# Patient Record
Sex: FEMALE | Race: BLACK OR AFRICAN AMERICAN | NOT HISPANIC OR LATINO | Employment: UNEMPLOYED | URBAN - METROPOLITAN AREA
[De-identification: names, ages, dates, MRNs, and addresses within clinical notes are randomized per-mention and may not be internally consistent; named-entity substitution may affect disease eponyms.]

---

## 2019-12-22 ENCOUNTER — HOSPITAL ENCOUNTER (EMERGENCY)
Facility: HOSPITAL | Age: 19
Discharge: HOME | End: 2019-12-22
Attending: EMERGENCY MEDICINE
Payer: MEDICAID

## 2019-12-22 VITALS
SYSTOLIC BLOOD PRESSURE: 132 MMHG | BODY MASS INDEX: 49.15 KG/M2 | WEIGHT: 267.06 LBS | TEMPERATURE: 98 F | HEIGHT: 62 IN | RESPIRATION RATE: 18 BRPM | OXYGEN SATURATION: 98 % | HEART RATE: 76 BPM | DIASTOLIC BLOOD PRESSURE: 59 MMHG

## 2019-12-22 DIAGNOSIS — R07.9 CHEST PAIN: ICD-10-CM

## 2019-12-22 DIAGNOSIS — O21.0 HYPEREMESIS GRAVIDARUM: Primary | ICD-10-CM

## 2019-12-22 DIAGNOSIS — N39.0 LOWER URINARY TRACT INFECTION: ICD-10-CM

## 2019-12-22 LAB
ALBUMIN SERPL BCP-MCNC: 3.8 G/DL (ref 3.5–5.2)
ALP SERPL-CCNC: 116 U/L (ref 55–135)
ALT SERPL W/O P-5'-P-CCNC: 43 U/L (ref 10–44)
ANION GAP SERPL CALC-SCNC: 16 MMOL/L (ref 8–16)
AST SERPL-CCNC: 34 U/L (ref 10–40)
BACTERIA #/AREA URNS HPF: ABNORMAL /HPF
BASOPHILS # BLD AUTO: 0.03 K/UL (ref 0–0.2)
BASOPHILS NFR BLD: 0.4 % (ref 0–1.9)
BILIRUB SERPL-MCNC: 2.9 MG/DL (ref 0.1–1)
BILIRUB UR QL STRIP: ABNORMAL
BUN SERPL-MCNC: 6 MG/DL (ref 6–20)
CALCIUM SERPL-MCNC: 10.3 MG/DL (ref 8.7–10.5)
CHLORIDE SERPL-SCNC: 105 MMOL/L (ref 95–110)
CLARITY UR: ABNORMAL
CO2 SERPL-SCNC: 16 MMOL/L (ref 23–29)
COLOR UR: YELLOW
CREAT SERPL-MCNC: 0.7 MG/DL (ref 0.5–1.4)
DIFFERENTIAL METHOD: ABNORMAL
EOSINOPHIL # BLD AUTO: 0 K/UL (ref 0–0.5)
EOSINOPHIL NFR BLD: 0.1 % (ref 0–8)
ERYTHROCYTE [DISTWIDTH] IN BLOOD BY AUTOMATED COUNT: 13.4 % (ref 11.5–14.5)
EST. GFR  (AFRICAN AMERICAN): >60 ML/MIN/1.73 M^2
EST. GFR  (NON AFRICAN AMERICAN): >60 ML/MIN/1.73 M^2
GLUCOSE SERPL-MCNC: 97 MG/DL (ref 70–110)
GLUCOSE UR QL STRIP: NEGATIVE
HCT VFR BLD AUTO: 41.5 % (ref 37–48.5)
HGB BLD-MCNC: 13.9 G/DL (ref 12–16)
HGB UR QL STRIP: NEGATIVE
HIV 1+2 AB+HIV1 P24 AG SERPL QL IA: NEGATIVE
HYALINE CASTS #/AREA URNS LPF: 0 /LPF
IMM GRANULOCYTES # BLD AUTO: 0.04 K/UL (ref 0–0.04)
IMM GRANULOCYTES NFR BLD AUTO: 0.6 % (ref 0–0.5)
INFLUENZA A, MOLECULAR: NEGATIVE
INFLUENZA B, MOLECULAR: NEGATIVE
KETONES UR QL STRIP: ABNORMAL
LEUKOCYTE ESTERASE UR QL STRIP: ABNORMAL
LYMPHOCYTES # BLD AUTO: 1.5 K/UL (ref 1–4.8)
LYMPHOCYTES NFR BLD: 22.1 % (ref 18–48)
MCH RBC QN AUTO: 28.7 PG (ref 27–31)
MCHC RBC AUTO-ENTMCNC: 33.5 G/DL (ref 32–36)
MCV RBC AUTO: 86 FL (ref 82–98)
MICROSCOPIC COMMENT: ABNORMAL
MONOCYTES # BLD AUTO: 0.4 K/UL (ref 0.3–1)
MONOCYTES NFR BLD: 6.1 % (ref 4–15)
NEUTROPHILS # BLD AUTO: 4.9 K/UL (ref 1.8–7.7)
NEUTROPHILS NFR BLD: 70.7 % (ref 38–73)
NITRITE UR QL STRIP: NEGATIVE
NRBC BLD-RTO: 0 /100 WBC
PH UR STRIP: 6 [PH] (ref 5–8)
PLATELET # BLD AUTO: 380 K/UL (ref 150–350)
PMV BLD AUTO: 11.1 FL (ref 9.2–12.9)
POTASSIUM SERPL-SCNC: 3.2 MMOL/L (ref 3.5–5.1)
PROT SERPL-MCNC: 8 G/DL (ref 6–8.4)
PROT UR QL STRIP: ABNORMAL
RBC # BLD AUTO: 4.85 M/UL (ref 4–5.4)
RBC #/AREA URNS HPF: 0 /HPF (ref 0–4)
SODIUM SERPL-SCNC: 137 MMOL/L (ref 136–145)
SP GR UR STRIP: >=1.03 (ref 1–1.03)
SPECIMEN SOURCE: NORMAL
SQUAMOUS #/AREA URNS HPF: 10 /HPF
URN SPEC COLLECT METH UR: ABNORMAL
UROBILINOGEN UR STRIP-ACNC: ABNORMAL EU/DL
WBC # BLD AUTO: 6.93 K/UL (ref 3.9–12.7)
WBC #/AREA URNS HPF: 30 /HPF (ref 0–5)
YEAST URNS QL MICRO: ABNORMAL

## 2019-12-22 PROCEDURE — 93010 EKG 12-LEAD: ICD-10-PCS | Mod: ,,, | Performed by: INTERNAL MEDICINE

## 2019-12-22 PROCEDURE — 85025 COMPLETE CBC W/AUTO DIFF WBC: CPT

## 2019-12-22 PROCEDURE — 99284 EMERGENCY DEPT VISIT MOD MDM: CPT | Mod: 25

## 2019-12-22 PROCEDURE — 96361 HYDRATE IV INFUSION ADD-ON: CPT

## 2019-12-22 PROCEDURE — 63600175 PHARM REV CODE 636 W HCPCS: Performed by: REGISTERED NURSE

## 2019-12-22 PROCEDURE — 80053 COMPREHEN METABOLIC PANEL: CPT

## 2019-12-22 PROCEDURE — 93005 ELECTROCARDIOGRAM TRACING: CPT

## 2019-12-22 PROCEDURE — 96372 THER/PROPH/DIAG INJ SC/IM: CPT

## 2019-12-22 PROCEDURE — 86703 HIV-1/HIV-2 1 RESULT ANTBDY: CPT

## 2019-12-22 PROCEDURE — 93010 ELECTROCARDIOGRAM REPORT: CPT | Mod: ,,, | Performed by: INTERNAL MEDICINE

## 2019-12-22 PROCEDURE — 96375 TX/PRO/DX INJ NEW DRUG ADDON: CPT

## 2019-12-22 PROCEDURE — 87502 INFLUENZA DNA AMP PROBE: CPT

## 2019-12-22 PROCEDURE — 87086 URINE CULTURE/COLONY COUNT: CPT

## 2019-12-22 PROCEDURE — 96374 THER/PROPH/DIAG INJ IV PUSH: CPT

## 2019-12-22 PROCEDURE — 81000 URINALYSIS NONAUTO W/SCOPE: CPT

## 2019-12-22 RX ORDER — NITROFURANTOIN 25; 75 MG/1; MG/1
100 CAPSULE ORAL 2 TIMES DAILY
Qty: 10 CAPSULE | Refills: 0 | Status: SHIPPED | OUTPATIENT
Start: 2019-12-22 | End: 2019-12-27

## 2019-12-22 RX ORDER — METOCLOPRAMIDE HYDROCHLORIDE 5 MG/ML
10 INJECTION INTRAMUSCULAR; INTRAVENOUS
Status: COMPLETED | OUTPATIENT
Start: 2019-12-22 | End: 2019-12-22

## 2019-12-22 RX ORDER — CEFTRIAXONE 1 G/1
1 INJECTION, POWDER, FOR SOLUTION INTRAMUSCULAR; INTRAVENOUS
Status: COMPLETED | OUTPATIENT
Start: 2019-12-22 | End: 2019-12-22

## 2019-12-22 RX ORDER — DIPHENHYDRAMINE HYDROCHLORIDE 50 MG/ML
12.5 INJECTION INTRAMUSCULAR; INTRAVENOUS
Status: COMPLETED | OUTPATIENT
Start: 2019-12-22 | End: 2019-12-22

## 2019-12-22 RX ORDER — METOCLOPRAMIDE 10 MG/1
10 TABLET ORAL EVERY 6 HOURS PRN
Qty: 30 TABLET | Refills: 0 | Status: SHIPPED | OUTPATIENT
Start: 2019-12-22

## 2019-12-22 RX ADMIN — SODIUM CHLORIDE 1000 ML: 0.9 INJECTION, SOLUTION INTRAVENOUS at 04:12

## 2019-12-22 RX ADMIN — DIPHENHYDRAMINE HYDROCHLORIDE 12.5 MG: 50 INJECTION, SOLUTION INTRAMUSCULAR; INTRAVENOUS at 04:12

## 2019-12-22 RX ADMIN — METOCLOPRAMIDE 10 MG: 5 INJECTION, SOLUTION INTRAMUSCULAR; INTRAVENOUS at 04:12

## 2019-12-22 RX ADMIN — CEFTRIAXONE SODIUM 1 G: 1 INJECTION, POWDER, FOR SOLUTION INTRAMUSCULAR; INTRAVENOUS at 06:12

## 2019-12-22 NOTE — ED PROVIDER NOTES
SCRIBE #1 NOTE: I, Graciela Mooreal, am scribing for, and in the presence of, Andres Pro Jr., FNP. I have scribed the entire note.      History      Chief Complaint   Patient presents with    Chest Pain     nausea/vomiting x3 days, cough, denies fever       Review of patient's allergies indicates:  No Known Allergies     HPI   HPI    12/22/2019, 4:37 PM   History obtained from the patient      History of Present Illness: Yecenia Clarke is a 19 y.o. female patient who presents to the Emergency Department for nausea and vomitting which onset 3 days PTA. Pt is 8 weeks pregnant and [facility]. Symptoms are constant and moderate in severity.There are no mitigating or exacerbating factors noted. Associated sxs include cough, diarrhea. Patient denies any fever, chills, numbness, weakness, SOB, abd pain, vaginal bleed, vaginal discharge, and all other sxs at this time. Prior Tx includes Phenergan but was unable to keep down. No further complaints or concerns at this time.     While the chief complaint by nursing assessment was documented as CP, the patient indicates their chief complaint during my interview as nausea and vomitting.    Arrival mode: Personal vehicle    Past Medical History:  History reviewed. No pertinent medical history.    Past Surgical History:  History reviewed. No pertinent surgical history.    Family History:  History reviewed. No pertinent family history.    Social History:  Social History Main Topics    Smoking status: Unknown if ever smoked    Smokeless tobacco: Unknown if ever used    Alcohol Use: Unknown drinking history    Drug Use: Unknown if ever used    Sexual Activity: Unknown       ROS   Review of Systems   Constitutional: Negative for chills and fever.   HENT: Negative for sore throat.    Respiratory: Positive for cough. Negative for shortness of breath.    Cardiovascular: Negative for chest pain.   Gastrointestinal: Positive for diarrhea, nausea and  "vomiting. Negative for abdominal pain.   Genitourinary: Negative for dysuria, vaginal bleeding and vaginal discharge.   Musculoskeletal: Negative for back pain.   Skin: Negative for rash.   Neurological: Negative for weakness, numbness and headaches.   Hematological: Does not bruise/bleed easily.   All other systems reviewed and are negative.      Physical Exam      Initial Vitals [12/22/19 1628]   BP Pulse Resp Temp SpO2   130/77 93 18 98.3 °F (36.8 °C) 99 %      MAP       --          Physical Exam  Nursing Notes and Vital Signs Reviewed.  Constitutional: Patient is in no acute distress. Well-developed and well-nourished.  Head: Atraumatic. Normocephalic.  Eyes: PERRL. EOM intact. Conjunctivae are not pale. No scleral icterus.  ENT: Mucous membranes are moist. Oropharynx is clear and symmetric.    Neck: Supple. Full ROM. No lymphadenopathy.  Cardiovascular: Regular rate. Regular rhythm. No murmurs, rubs, or gallops. Distal pulses are 2+ and symmetric.  Pulmonary/Chest: No respiratory distress. Clear to auscultation bilaterally. No wheezing or rales.  Abdominal: Soft and non-distended.  There is no tenderness.  No rebound, guarding, or rigidity. Good bowel sounds.  Genitourinary: No CVA tenderness  Musculoskeletal: Moves all extremities. No obvious deformities. No edema. No calf tenderness.  Skin: Warm and dry.  Neurological:  Alert, awake, and appropriate.  Normal speech.  No acute focal neurological deficits are appreciated.  Psychiatric: Normal affect. Good eye contact. Appropriate in content.    ED Course    Procedures  ED Vital Signs:  Vitals:    12/22/19 1628 12/22/19 1839   BP: 130/77 (!) 132/59   Pulse: 93 76   Resp: 18 18   Temp: 98.3 °F (36.8 °C)    TempSrc: Oral    SpO2: 99% 98%   Weight: 121.2 kg (267 lb 1.4 oz)    Height: 5' 2" (1.575 m)        Abnormal Lab Results:  Labs Reviewed   CBC W/ AUTO DIFFERENTIAL - Abnormal; Notable for the following components:       Result Value    Platelets 380 (*)     " Immature Granulocytes 0.6 (*)     All other components within normal limits   COMPREHENSIVE METABOLIC PANEL - Abnormal; Notable for the following components:    Potassium 3.2 (*)     CO2 16 (*)     Total Bilirubin 2.9 (*)     All other components within normal limits   URINALYSIS, REFLEX TO URINE CULTURE - Abnormal; Notable for the following components:    Appearance, UA Hazy (*)     Specific Gravity, UA >=1.030 (*)     Protein, UA 1+ (*)     Ketones, UA 3+ (*)     Bilirubin (UA) 2+ (*)     Urobilinogen, UA 2.0-3.0 (*)     Leukocytes, UA Trace (*)     All other components within normal limits    Narrative:     Preferred Collection Type->Urine, Clean Catch   URINALYSIS MICROSCOPIC - Abnormal; Notable for the following components:    WBC, UA 30 (*)     Bacteria Many (*)     All other components within normal limits    Narrative:     Preferred Collection Type->Urine, Clean Catch   INFLUENZA A & B BY MOLECULAR   CULTURE, URINE   HIV 1 / 2 ANTIBODY        All Lab Results:  Results for orders placed or performed during the hospital encounter of 12/22/19   Influenza A & B by Molecular   Result Value Ref Range    Influenza A, Molecular Negative Negative    Influenza B, Molecular Negative Negative    Flu A & B Source Nasal swab    HIV 1/2 Ag/Ab (4th Gen)   Result Value Ref Range    HIV 1/2 Ag/Ab Negative Negative   CBC auto differential   Result Value Ref Range    WBC 6.93 3.90 - 12.70 K/uL    RBC 4.85 4.00 - 5.40 M/uL    Hemoglobin 13.9 12.0 - 16.0 g/dL    Hematocrit 41.5 37.0 - 48.5 %    Mean Corpuscular Volume 86 82 - 98 fL    Mean Corpuscular Hemoglobin 28.7 27.0 - 31.0 pg    Mean Corpuscular Hemoglobin Conc 33.5 32.0 - 36.0 g/dL    RDW 13.4 11.5 - 14.5 %    Platelets 380 (H) 150 - 350 K/uL    MPV 11.1 9.2 - 12.9 fL    Immature Granulocytes 0.6 (H) 0.0 - 0.5 %    Gran # (ANC) 4.9 1.8 - 7.7 K/uL    Immature Grans (Abs) 0.04 0.00 - 0.04 K/uL    Lymph # 1.5 1.0 - 4.8 K/uL    Mono # 0.4 0.3 - 1.0 K/uL    Eos # 0.0 0.0 - 0.5  K/uL    Baso # 0.03 0.00 - 0.20 K/uL    nRBC 0 0 /100 WBC    Gran% 70.7 38.0 - 73.0 %    Lymph% 22.1 18.0 - 48.0 %    Mono% 6.1 4.0 - 15.0 %    Eosinophil% 0.1 0.0 - 8.0 %    Basophil% 0.4 0.0 - 1.9 %    Differential Method Automated    Comprehensive metabolic panel   Result Value Ref Range    Sodium 137 136 - 145 mmol/L    Potassium 3.2 (L) 3.5 - 5.1 mmol/L    Chloride 105 95 - 110 mmol/L    CO2 16 (L) 23 - 29 mmol/L    Glucose 97 70 - 110 mg/dL    BUN, Bld 6 6 - 20 mg/dL    Creatinine 0.7 0.5 - 1.4 mg/dL    Calcium 10.3 8.7 - 10.5 mg/dL    Total Protein 8.0 6.0 - 8.4 g/dL    Albumin 3.8 3.5 - 5.2 g/dL    Total Bilirubin 2.9 (H) 0.1 - 1.0 mg/dL    Alkaline Phosphatase 116 55 - 135 U/L    AST 34 10 - 40 U/L    ALT 43 10 - 44 U/L    Anion Gap 16 8 - 16 mmol/L    eGFR if African American >60 >60 mL/min/1.73 m^2    eGFR if non African American >60 >60 mL/min/1.73 m^2   Urinalysis, Reflex to Urine Culture Urine, Clean Catch   Result Value Ref Range    Specimen UA Urine, Clean Catch     Color, UA Yellow Yellow, Straw, Olga    Appearance, UA Hazy (A) Clear    pH, UA 6.0 5.0 - 8.0    Specific Gravity, UA >=1.030 (A) 1.005 - 1.030    Protein, UA 1+ (A) Negative    Glucose, UA Negative Negative    Ketones, UA 3+ (A) Negative    Bilirubin (UA) 2+ (A) Negative    Occult Blood UA Negative Negative    Nitrite, UA Negative Negative    Urobilinogen, UA 2.0-3.0 (A) <2.0 EU/dL    Leukocytes, UA Trace (A) Negative   Urinalysis Microscopic   Result Value Ref Range    RBC, UA 0 0 - 4 /hpf    WBC, UA 30 (H) 0 - 5 /hpf    Bacteria Many (A) None-Occ /hpf    Yeast, UA None None    Squam Epithel, UA 10 /hpf    Hyaline Casts, UA 0 0-1/lpf /lpf    Microscopic Comment SEE COMMENT          Imaging Results:  Imaging Results    None        The EKG was ordered, reviewed, and independently interpreted by the ED provider.  Interpretation time: 16:33  Rate: 88 BPM  Rhythm: Sinus rhythm with marked sinus arythmia  Interpretation: Minimal voltage  criteria for LVH, may be normal variant. No STEMI.           The Emergency Provider reviewed the vital signs and test results, which are outlined above.    ED Discussion     6:07 PM: Reassessed pt at this time.  Pt states her condition has improved at this time. Discussed with pt all pertinent ED information and results. Discussed pt dx and plan of tx. Gave pt all f/u and return to the ED instructions. All questions and concerns were addressed at this time. Pt expresses understanding of information and instructions, and is comfortable with plan to discharge. Pt is stable for discharge.    I discussed with patient and/or family/caretaker that evaluation in the ED does not suggest any emergent or life threatening medical conditions requiring immediate intervention beyond what was provided in the ED, and I believe patient is safe for discharge.  Regardless, an unremarkable evaluation in the ED does not preclude the development or presence of a serious of life threatening condition. As such, patient was instructed to return immediately for any worsening or change in current symptoms.    I counseled the patient on the risks of taking antibiotics, including taking all doses as prescribed. I explained the risk of antibiotic resistance in the future and susceptibility to C. diff infection, severe allergic reactions, and yeast infections.       ED Medication(s):  Medications   sodium chloride 0.9% bolus 1,000 mL (0 mLs Intravenous Stopped 12/22/19 1831)   metoclopramide HCl injection 10 mg (10 mg Intravenous Given 12/22/19 1655)   diphenhydrAMINE injection 12.5 mg (12.5 mg Intravenous Given 12/22/19 1655)   cefTRIAXone injection 1 g (1 g Intramuscular Given 12/22/19 1837)     Discharge Medication List as of 12/22/2019  6:07 PM      START taking these medications    Details   metoclopramide HCl (REGLAN) 10 MG tablet Take 1 tablet (10 mg total) by mouth every 6 (six) hours as needed (nausea and vomiting)., Starting Sun  12/22/2019, Print      nitrofurantoin, macrocrystal-monohydrate, (MACROBID) 100 MG capsule Take 1 capsule (100 mg total) by mouth 2 (two) times daily. for 5 days, Starting Sun 12/22/2019, Until Fri 12/27/2019, Print              Medication List      START taking these medications    metoclopramide HCl 10 MG tablet  Commonly known as:  REGLAN  Take 1 tablet (10 mg total) by mouth every 6 (six) hours as needed (nausea and vomiting).     nitrofurantoin (macrocrystal-monohydrate) 100 MG capsule  Commonly known as:  MACROBID  Take 1 capsule (100 mg total) by mouth 2 (two) times daily. for 5 days           Where to Get Your Medications      You can get these medications from any pharmacy    Bring a paper prescription for each of these medications  · metoclopramide HCl 10 MG tablet  · nitrofurantoin (macrocrystal-monohydrate) 100 MG capsule         Follow-up Information     [facility] - .    Specialty:  Emergency Medicine  Why:  If symptoms worsen  Contact information:  [id number] [place] [id number] [phone]           OB/GYN In 3 days.                   Medical Decision Making    Medical Decision Making:   Clinical Tests:   Lab Tests: Ordered and Reviewed  Medical Tests: Ordered and Reviewed           Scribe Attestation:   Scribe #1: I performed the above scribed service and the documentation accurately describes the services I performed. I attest to the accuracy of the note.    Attending:   Physician Attestation Statement for Scribe #1: I, Andres Pro Jr., FNP, personally performed the services described in this documentation, as scribed by Graciela rGewal, in my presence, and it is both accurate and complete.          Clinical Impression       ICD-10-CM ICD-9-CM   1. Hyperemesis gravidarum O21.0 643.00   2. Chest pain R07.9 786.50   3. Lower urinary tract infection N39.0 599.0       Disposition:   Disposition: Discharged  Condition: Stable         Andres Pro  , [facility]  12/22/19 0542

## 2019-12-23 LAB — BACTERIA UR CULT: NORMAL

## 2019-12-24 ENCOUNTER — HOSPITAL ENCOUNTER (INPATIENT)
Facility: HOSPITAL | Age: 19
LOS: 4 days | Discharge: HOME | DRG: 832 | End: 2019-12-28
Attending: EMERGENCY MEDICINE | Admitting: OBSTETRICS & GYNECOLOGY
Payer: MEDICAID

## 2019-12-24 DIAGNOSIS — O21.0 HYPEREMESIS GRAVIDARUM: Primary | ICD-10-CM

## 2019-12-24 DIAGNOSIS — R11.10 HYPEREMESIS: ICD-10-CM

## 2019-12-24 DIAGNOSIS — E87.6 HYPOKALEMIA: ICD-10-CM

## 2019-12-24 DIAGNOSIS — A59.01 VAGINITIS DUE TO TRICHOMONAS: ICD-10-CM

## 2019-12-24 LAB
ALBUMIN SERPL BCP-MCNC: 3.7 G/DL (ref 3.5–5.2)
ALP SERPL-CCNC: 113 U/L (ref 55–135)
ALT SERPL W/O P-5'-P-CCNC: 60 U/L (ref 10–44)
ANION GAP SERPL CALC-SCNC: 19 MMOL/L (ref 8–16)
AST SERPL-CCNC: 37 U/L (ref 10–40)
B-HCG UR QL: POSITIVE
BACTERIA #/AREA URNS HPF: ABNORMAL /HPF
BASOPHILS # BLD AUTO: 0.02 K/UL (ref 0–0.2)
BASOPHILS NFR BLD: 0.3 % (ref 0–1.9)
BILIRUB SERPL-MCNC: 2.4 MG/DL (ref 0.1–1)
BILIRUB UR QL STRIP: ABNORMAL
BUN SERPL-MCNC: 4 MG/DL (ref 6–20)
CALCIUM SERPL-MCNC: 9.9 MG/DL (ref 8.7–10.5)
CHLORIDE SERPL-SCNC: 103 MMOL/L (ref 95–110)
CLARITY UR: CLEAR
CO2 SERPL-SCNC: 16 MMOL/L (ref 23–29)
COLOR UR: YELLOW
CREAT SERPL-MCNC: 0.7 MG/DL (ref 0.5–1.4)
DIFFERENTIAL METHOD: ABNORMAL
EOSINOPHIL # BLD AUTO: 0 K/UL (ref 0–0.5)
EOSINOPHIL NFR BLD: 0.1 % (ref 0–8)
ERYTHROCYTE [DISTWIDTH] IN BLOOD BY AUTOMATED COUNT: 13.3 % (ref 11.5–14.5)
EST. GFR  (AFRICAN AMERICAN): >60 ML/MIN/1.73 M^2
EST. GFR  (NON AFRICAN AMERICAN): >60 ML/MIN/1.73 M^2
GLUCOSE SERPL-MCNC: 79 MG/DL (ref 70–110)
GLUCOSE UR QL STRIP: NEGATIVE
HCT VFR BLD AUTO: 41 % (ref 37–48.5)
HGB BLD-MCNC: 13.8 G/DL (ref 12–16)
HGB UR QL STRIP: NEGATIVE
HYALINE CASTS #/AREA URNS LPF: 7 /LPF
IMM GRANULOCYTES # BLD AUTO: 0.02 K/UL (ref 0–0.04)
IMM GRANULOCYTES NFR BLD AUTO: 0.3 % (ref 0–0.5)
KETONES UR QL STRIP: ABNORMAL
LEUKOCYTE ESTERASE UR QL STRIP: ABNORMAL
LIPASE SERPL-CCNC: 42 U/L (ref 4–60)
LYMPHOCYTES # BLD AUTO: 1.7 K/UL (ref 1–4.8)
LYMPHOCYTES NFR BLD: 23 % (ref 18–48)
MAGNESIUM SERPL-MCNC: 1.7 MG/DL (ref 1.6–2.6)
MCH RBC QN AUTO: 28.8 PG (ref 27–31)
MCHC RBC AUTO-ENTMCNC: 33.7 G/DL (ref 32–36)
MCV RBC AUTO: 85 FL (ref 82–98)
MICROSCOPIC COMMENT: ABNORMAL
MONOCYTES # BLD AUTO: 0.6 K/UL (ref 0.3–1)
MONOCYTES NFR BLD: 7.8 % (ref 4–15)
NEUTROPHILS # BLD AUTO: 5.1 K/UL (ref 1.8–7.7)
NEUTROPHILS NFR BLD: 68.5 % (ref 38–73)
NITRITE UR QL STRIP: NEGATIVE
NRBC BLD-RTO: 0 /100 WBC
PH UR STRIP: 6 [PH] (ref 5–8)
PLATELET # BLD AUTO: 364 K/UL (ref 150–350)
PMV BLD AUTO: 11.3 FL (ref 9.2–12.9)
POTASSIUM SERPL-SCNC: 3.7 MMOL/L (ref 3.5–5.1)
PROT SERPL-MCNC: 8.1 G/DL (ref 6–8.4)
PROT UR QL STRIP: ABNORMAL
RBC # BLD AUTO: 4.8 M/UL (ref 4–5.4)
RBC #/AREA URNS HPF: 0 /HPF (ref 0–4)
SODIUM SERPL-SCNC: 138 MMOL/L (ref 136–145)
SP GR UR STRIP: >=1.03 (ref 1–1.03)
SQUAMOUS #/AREA URNS HPF: 10 /HPF
TRICHOMONAS UR QL MICRO: ABNORMAL
URN SPEC COLLECT METH UR: ABNORMAL
UROBILINOGEN UR STRIP-ACNC: ABNORMAL EU/DL
WBC # BLD AUTO: 7.42 K/UL (ref 3.9–12.7)
WBC #/AREA URNS HPF: 10 /HPF (ref 0–5)

## 2019-12-24 PROCEDURE — 96365 THER/PROPH/DIAG IV INF INIT: CPT

## 2019-12-24 PROCEDURE — 63600175 PHARM REV CODE 636 W HCPCS: Performed by: EMERGENCY MEDICINE

## 2019-12-24 PROCEDURE — 80053 COMPREHEN METABOLIC PANEL: CPT

## 2019-12-24 PROCEDURE — 11000001 HC ACUTE MED/SURG PRIVATE ROOM

## 2019-12-24 PROCEDURE — 81025 URINE PREGNANCY TEST: CPT

## 2019-12-24 PROCEDURE — 36415 COLL VENOUS BLD VENIPUNCTURE: CPT

## 2019-12-24 PROCEDURE — 99285 EMERGENCY DEPT VISIT HI MDM: CPT | Mod: 25

## 2019-12-24 PROCEDURE — 83690 ASSAY OF LIPASE: CPT

## 2019-12-24 PROCEDURE — 96375 TX/PRO/DX INJ NEW DRUG ADDON: CPT

## 2019-12-24 PROCEDURE — 96367 TX/PROPH/DG ADDL SEQ IV INF: CPT

## 2019-12-24 PROCEDURE — 81000 URINALYSIS NONAUTO W/SCOPE: CPT

## 2019-12-24 PROCEDURE — 85025 COMPLETE CBC W/AUTO DIFF WBC: CPT

## 2019-12-24 PROCEDURE — 96361 HYDRATE IV INFUSION ADD-ON: CPT

## 2019-12-24 PROCEDURE — 83735 ASSAY OF MAGNESIUM: CPT

## 2019-12-24 PROCEDURE — 25000003 PHARM REV CODE 250: Performed by: EMERGENCY MEDICINE

## 2019-12-24 RX ORDER — POTASSIUM CHLORIDE 20 MEQ/15ML
40 SOLUTION ORAL
Status: COMPLETED | OUTPATIENT
Start: 2019-12-24 | End: 2019-12-24

## 2019-12-24 RX ORDER — ONDANSETRON 2 MG/ML
4 INJECTION INTRAMUSCULAR; INTRAVENOUS
Status: COMPLETED | OUTPATIENT
Start: 2019-12-24 | End: 2019-12-24

## 2019-12-24 RX ORDER — POTASSIUM CHLORIDE 20 MEQ/1
40 TABLET, EXTENDED RELEASE ORAL ONCE
Status: COMPLETED | OUTPATIENT
Start: 2019-12-24 | End: 2019-12-24

## 2019-12-24 RX ADMIN — CEFTRIAXONE 1 G: 1 INJECTION, SOLUTION INTRAVENOUS at 07:12

## 2019-12-24 RX ADMIN — POTASSIUM CHLORIDE 40 MEQ: 20 SOLUTION ORAL at 09:12

## 2019-12-24 RX ADMIN — SODIUM CHLORIDE 1000 ML: 0.9 INJECTION, SOLUTION INTRAVENOUS at 07:12

## 2019-12-24 RX ADMIN — PROMETHAZINE HYDROCHLORIDE 12.5 MG: 25 INJECTION INTRAMUSCULAR; INTRAVENOUS at 09:12

## 2019-12-24 RX ADMIN — POTASSIUM CHLORIDE 40 MEQ: 20 TABLET, EXTENDED RELEASE ORAL at 09:12

## 2019-12-24 RX ADMIN — ONDANSETRON 4 MG: 2 INJECTION INTRAMUSCULAR; INTRAVENOUS at 07:12

## 2019-12-25 PROBLEM — E87.6 HYPOKALEMIA: Status: ACTIVE | Noted: 2019-12-25

## 2019-12-25 PROBLEM — O21.0 HYPEREMESIS GRAVIDARUM: Status: ACTIVE | Noted: 2019-12-25

## 2019-12-25 PROBLEM — R11.10 HYPEREMESIS: Status: ACTIVE | Noted: 2019-12-25

## 2019-12-25 PROBLEM — A59.9 TRICHOMONAS INFECTION: Status: ACTIVE | Noted: 2019-12-25

## 2019-12-25 LAB
ANION GAP SERPL CALC-SCNC: 14 MMOL/L (ref 8–16)
BUN SERPL-MCNC: 3 MG/DL (ref 6–20)
CALCIUM SERPL-MCNC: 9.3 MG/DL (ref 8.7–10.5)
CHLORIDE SERPL-SCNC: 105 MMOL/L (ref 95–110)
CO2 SERPL-SCNC: 19 MMOL/L (ref 23–29)
CREAT SERPL-MCNC: 0.7 MG/DL (ref 0.5–1.4)
EST. GFR  (AFRICAN AMERICAN): >60 ML/MIN/1.73 M^2
EST. GFR  (NON AFRICAN AMERICAN): >60 ML/MIN/1.73 M^2
GLUCOSE SERPL-MCNC: 109 MG/DL (ref 70–110)
MAGNESIUM SERPL-MCNC: 1.6 MG/DL (ref 1.6–2.6)
POTASSIUM SERPL-SCNC: 2.7 MMOL/L (ref 3.5–5.1)
SODIUM SERPL-SCNC: 138 MMOL/L (ref 136–145)

## 2019-12-25 PROCEDURE — 63600175 PHARM REV CODE 636 W HCPCS: Performed by: OBSTETRICS & GYNECOLOGY

## 2019-12-25 PROCEDURE — 63600175 PHARM REV CODE 636 W HCPCS: Performed by: EMERGENCY MEDICINE

## 2019-12-25 PROCEDURE — 25000003 PHARM REV CODE 250: Performed by: OBSTETRICS & GYNECOLOGY

## 2019-12-25 PROCEDURE — 99223 1ST HOSP IP/OBS HIGH 75: CPT | Mod: ,,, | Performed by: OBSTETRICS & GYNECOLOGY

## 2019-12-25 PROCEDURE — 80048 BASIC METABOLIC PNL TOTAL CA: CPT

## 2019-12-25 PROCEDURE — 96374 THER/PROPH/DIAG INJ IV PUSH: CPT | Mod: 59

## 2019-12-25 PROCEDURE — 96375 TX/PRO/DX INJ NEW DRUG ADDON: CPT

## 2019-12-25 PROCEDURE — 11000001 HC ACUTE MED/SURG PRIVATE ROOM

## 2019-12-25 PROCEDURE — G0378 HOSPITAL OBSERVATION PER HR: HCPCS

## 2019-12-25 PROCEDURE — 99223 PR INITIAL HOSPITAL CARE,LEVL III: ICD-10-PCS | Mod: ,,, | Performed by: OBSTETRICS & GYNECOLOGY

## 2019-12-25 PROCEDURE — 83735 ASSAY OF MAGNESIUM: CPT

## 2019-12-25 PROCEDURE — 96361 HYDRATE IV INFUSION ADD-ON: CPT

## 2019-12-25 PROCEDURE — S0028 INJECTION, FAMOTIDINE, 20 MG: HCPCS | Performed by: OBSTETRICS & GYNECOLOGY

## 2019-12-25 PROCEDURE — 36415 COLL VENOUS BLD VENIPUNCTURE: CPT

## 2019-12-25 PROCEDURE — 96376 TX/PRO/DX INJ SAME DRUG ADON: CPT

## 2019-12-25 RX ORDER — DEXTROSE MONOHYDRATE AND SODIUM CHLORIDE 5; .9 G/100ML; G/100ML
1000 INJECTION, SOLUTION INTRAVENOUS CONTINUOUS
Status: DISCONTINUED | OUTPATIENT
Start: 2019-12-25 | End: 2019-12-25

## 2019-12-25 RX ORDER — FAMOTIDINE 20 MG/50ML
20 INJECTION, SOLUTION INTRAVENOUS 2 TIMES DAILY
Status: DISCONTINUED | OUTPATIENT
Start: 2019-12-25 | End: 2019-12-28 | Stop reason: HOSPADM

## 2019-12-25 RX ORDER — ONDANSETRON 2 MG/ML
4 INJECTION INTRAMUSCULAR; INTRAVENOUS EVERY 6 HOURS PRN
Status: DISCONTINUED | OUTPATIENT
Start: 2019-12-25 | End: 2019-12-28 | Stop reason: HOSPADM

## 2019-12-25 RX ORDER — SCOLOPAMINE TRANSDERMAL SYSTEM 1 MG/1
1 PATCH, EXTENDED RELEASE TRANSDERMAL
Status: DISCONTINUED | OUTPATIENT
Start: 2019-12-25 | End: 2019-12-28 | Stop reason: HOSPADM

## 2019-12-25 RX ORDER — POTASSIUM CHLORIDE 20 MEQ/1
40 TABLET, EXTENDED RELEASE ORAL 2 TIMES DAILY
Status: DISCONTINUED | OUTPATIENT
Start: 2019-12-25 | End: 2019-12-27

## 2019-12-25 RX ORDER — ONDANSETRON 8 MG/1
8 TABLET, ORALLY DISINTEGRATING ORAL EVERY 8 HOURS PRN
Status: DISCONTINUED | OUTPATIENT
Start: 2019-12-25 | End: 2019-12-28 | Stop reason: HOSPADM

## 2019-12-25 RX ORDER — POTASSIUM CHLORIDE 7.45 MG/ML
10 INJECTION INTRAVENOUS
Status: COMPLETED | OUTPATIENT
Start: 2019-12-25 | End: 2019-12-25

## 2019-12-25 RX ORDER — DEXTROSE, SODIUM CHLORIDE, SODIUM LACTATE, POTASSIUM CHLORIDE, AND CALCIUM CHLORIDE 5; .6; .31; .03; .02 G/100ML; G/100ML; G/100ML; G/100ML; G/100ML
INJECTION, SOLUTION INTRAVENOUS CONTINUOUS
Status: DISCONTINUED | OUTPATIENT
Start: 2019-12-25 | End: 2019-12-27

## 2019-12-25 RX ADMIN — FAMOTIDINE 20 MG: 20 INJECTION, SOLUTION INTRAVENOUS at 08:12

## 2019-12-25 RX ADMIN — DEXTROSE, SODIUM CHLORIDE, SODIUM LACTATE, POTASSIUM CHLORIDE, AND CALCIUM CHLORIDE: 5; .6; .31; .03; .02 INJECTION, SOLUTION INTRAVENOUS at 09:12

## 2019-12-25 RX ADMIN — POTASSIUM CHLORIDE 40 MEQ: 20 TABLET, EXTENDED RELEASE ORAL at 08:12

## 2019-12-25 RX ADMIN — ONDANSETRON 4 MG: 2 INJECTION INTRAMUSCULAR; INTRAVENOUS at 08:12

## 2019-12-25 RX ADMIN — POTASSIUM CHLORIDE 10 MEQ: 10 INJECTION, SOLUTION INTRAVENOUS at 11:12

## 2019-12-25 RX ADMIN — ONDANSETRON 4 MG: 2 INJECTION INTRAMUSCULAR; INTRAVENOUS at 09:12

## 2019-12-25 RX ADMIN — POTASSIUM CHLORIDE 10 MEQ: 10 INJECTION, SOLUTION INTRAVENOUS at 01:12

## 2019-12-25 RX ADMIN — SCOPALAMINE 1 PATCH: 1 PATCH, EXTENDED RELEASE TRANSDERMAL at 01:12

## 2019-12-25 RX ADMIN — FAMOTIDINE 20 MG: 20 INJECTION, SOLUTION INTRAVENOUS at 09:12

## 2019-12-25 RX ADMIN — DEXTROSE, SODIUM CHLORIDE, SODIUM LACTATE, POTASSIUM CHLORIDE, AND CALCIUM CHLORIDE: 5; .6; .31; .03; .02 INJECTION, SOLUTION INTRAVENOUS at 01:12

## 2019-12-25 NOTE — SUBJECTIVE & OBJECTIVE
Obstetric HPI:    This pregnancy has been complicated by Hyperemesis    OB History    Para Term  AB Living   1 0 0 0 0 0   SAB TAB Ectopic Multiple Live Births   0 0 0 0 0      # Outcome Date GA Lbr Eddi/2nd Weight Sex Delivery Anes PTL Lv   1 Current              No past medical history on file.  No past surgical history on file.      (Not in a hospital admission)    Review of patient's allergies indicates:  No Known Allergies     Family History     None        Tobacco Use    Smoking status: Never Smoker    Smokeless tobacco: Never Used   Substance and Sexual Activity    Alcohol use: Yes    Drug use: Never    Sexual activity: Yes     Review of Systems   Constitutional: Positive for appetite change and fatigue. Negative for activity change, chills, fever and unexpected weight change.   Respiratory: Negative for shortness of breath.    Cardiovascular: Negative for chest pain, palpitations and leg swelling.   Gastrointestinal: Positive for nausea and vomiting. Negative for abdominal pain, bloating, blood in stool, constipation and diarrhea.   Genitourinary: Negative for dysuria, flank pain, frequency, genital sores, hematuria, pelvic pain, urgency, vaginal bleeding, vaginal discharge, vaginal pain, urinary incontinence, vaginal dryness and vaginal odor.   Musculoskeletal: Negative for back pain.   Integumentary:  Negative for breast mass, nipple discharge, breast skin changes and breast tenderness.   Neurological: Negative for syncope and headaches.   Breast: Negative for asymmetry, lump, mass, mastodynia, nipple discharge, skin changes and tenderness     Objective:     Vital Signs (Most Recent):  Temp: 98.6 °F (37 °C) (19)  Pulse: 73 (19)  Resp: 18 (19)  BP: 121/66 (19)  SpO2: 100 % (19) Vital Signs (24h Range):  Temp:  [98.6 °F (37 °C)] 98.6 °F (37 °C)  Pulse:  [] 73  Resp:  [18-19] 18  SpO2:  [98 %-100 %] 100 %  BP: (121-141)/(58-84)  121/66     Weight: 119.3 kg (262 lb 14.4 oz)  Body mass index is 48.08 kg/m².      Physical Exam:   Constitutional: She is oriented to person, place, and time. She appears well-developed and well-nourished. No distress.    HENT:   Head: Normocephalic and atraumatic.    Eyes: Pupils are equal, round, and reactive to light. EOM are normal.    Neck: Normal range of motion.    Cardiovascular: Normal rate, regular rhythm and normal heart sounds.     Pulmonary/Chest: Effort normal and breath sounds normal.        Abdominal: Soft. Bowel sounds are normal. She exhibits no distension. There is no tenderness.             Musculoskeletal: Normal range of motion and moves all extremeties. She exhibits no edema or tenderness.       Neurological: She is alert and oriented to person, place, and time.    Skin: Skin is warm and dry.    Psychiatric: She has a normal mood and affect. Her behavior is normal. Thought content normal.       Significant Labs:  No results found for: GROUPTRH, HEPBSAG, RUBELLAIGGSC, STREPBCULT, AFP, [id number]    CBC:   Recent Labs   Lab 12/24/19 1937   WBC 7.42   RBC 4.80   HGB 13.8   HCT 41.0   *   MCV 85   MCH 28.8   MCHC 33.7     CMP:   Recent Labs   Lab 12/24/19 1937   GLU 79   CALCIUM 9.9   ALBUMIN 3.7   PROT 8.1      K 3.7   CO2 16*      BUN 4*   CREATININE 0.7   ALKPHOS 113   ALT 60*   AST 37   BILITOT 2.4*     I have personallly reviewed all pertinent lab results from the last 24 hours.

## 2019-12-25 NOTE — PLAN OF CARE
12/25/19 1433   Discharge Assessment   Assessment Type Discharge Planning Assessment   Confirmed/corrected address and phone number on facesheet? Yes   Assessment information obtained from? Patient   Prior to hospitilization cognitive status: Alert/Oriented   Prior to hospitalization functional status: Independent   Current cognitive status: Alert/Oriented   Current Functional Status: Independent   Lives With parent(s)   Able to Return to Prior Arrangements yes   Is patient able to care for self after discharge? Yes   Who are your caregiver(s) and their phone number(s)? Carie Clarke (mother) [phone]   Patient's perception of discharge disposition home or selfcare   Readmission Within the Last 30 Days no previous admission in last 30 days   Patient currently being followed by outpatient case management? No   Patient currently receives any other outside agency services? No   Equipment Currently Used at Home none   Do you have any problems affording any of your prescribed medications? No   Is the patient taking medications as prescribed? yes   Does the patient have transportation home? Yes   Transportation Anticipated family or friend will provide   Does the patient receive services at the [facility]? No   Discharge Plan A Home with family   DME Needed Upon Discharge  none   Patient/Family in Agreement with Plan yes     Met with pt and her mother at bedside for DC assessment. Pt lives at home with her mother and does not use any DME. No CM needs identified at this time. SWer provided a transitional care folder, information on advanced directives, information on pharmacy bedside delivery, and discharge planning begins on admission with contact information for any needs/questions. Pt opted for bedside medication delivery. Pt's typical pharmacy is [facility] on [address]. Information below.    [address] [place] [address]  [phone]  Jose Garcia LMSW 12/25/2019 2:39 PM

## 2019-12-25 NOTE — ED NOTES
"Patient identifiers verified and correct for Yecenia Clarke.    LOC: The patient is awake, alert and aware of environment with an appropriate affect, the patient is oriented x 3 and speaking appropriately.  APPEARANCE: Patient resting comfortably and in no acute distress, patient is clean and well groomed, patient's clothing is properly fastened.  SKIN: The skin is warm and dry, color consistent with ethnicity, patient has normal skin turgor and moist mucus membranes, skin intact, no breakdown or bruising noted.  MUSCULOSKELETAL: Patient moving all extremities spontaneously.  RESPIRATORY: Airway is open and patent, respirations are spontaneous.  CARDIAC: Patient has a normal rate, no periphreal edema noted, capillary refill < 3 seconds. EX: midsternal CP, pt reports "achy" feeling.   ABDOMEN: Soft and non tender to palpation. EX: Pt reports she is 9 wks pregnant.     "

## 2019-12-25 NOTE — NURSING
msged dr ahn asking if we can start advancing pt's diet, she thinks stating she thinks she can tolerate st to eat

## 2019-12-25 NOTE — HPI
18 yo  with IUP at approximately 9 WGA.  Pt presented to ER with complaints of persistent nausea and vomiting.  Pt unable to tolerate solids or liquids for 3-4 days now.  Denies fevers, abdominal pain, diarrhea, constipation, dysuria, vaginal bleeding.  Reports no weight loss yet.  Did not experience this with prior pregnancy.  Pt has presented to [facility] on several occasions for same over the past week.  Was seen by Dr. Owens at .

## 2019-12-25 NOTE — PROGRESS NOTES
[facility]  Obstetrics  Antepartum Progress Note    Patient Name: Yecenia Clarke  MRN: [id number]  Admission Date: 2019  Hospital Length of Stay: 0 days  Attending Physician: Anand Ellison MD  Primary Care Provider: Primary Doctor No    Subjective:     Principal Problem:Hyperemesis gravidarum    HPI:  20 yo  with IUP at approximately 9 WGA.  Pt presented to ER with complaints of persistent nausea and vomiting.  Pt unable to tolerate solids or liquids for 3-4 days now.  Denies fevers, abdominal pain, diarrhea, constipation, dysuria, vaginal bleeding.  Reports no weight loss yet.  Did not experience this with prior pregnancy.  Pt has presented to [facility] on several occasions for same over the past week.  Was seen by Dr. Owens at .    Hospital Course:  No notes on file    Obstetric HPI:  Pt reports feeling a little improved, but still reports feeling nauseated.  Had an episode of emesis this morning.  Denies abdominal pains or fevers.  Is NPO.     Objective:     Vital Signs (Most Recent):  Temp: 97.7 °F (36.5 °C) (19)  Pulse: 76 (19)  Resp: 14 (19)  BP: 107/64 (19)  SpO2: 100 % (19) Vital Signs (24h Range):  Temp:  [97.7 °F (36.5 °C)-98.6 °F (37 °C)] 97.7 °F (36.5 °C)  Pulse:  [] 76  Resp:  [14-19] 14  SpO2:  [97 %-100 %] 100 %  BP: (107-149)/(58-86) 107/64     Weight: 119 kg (262 lb 5.6 oz)  Body mass index is 47.98 kg/m².      Intake/Output Summary (Last 24 hours) at 2019 1004  Last data filed at 2019 0800  Gross per 24 hour   Intake 2050 ml   Output --   Net 2050 ml       Significant Labs:  Recent Lab Results       19  0401   19  1937   19  1839   19  1838        Albumin   3.7         Alkaline Phosphatase   113         ALT   60         Anion Gap 14 19         Appearance, UA     Clear       AST   37         Bacteria, UA     Few       Baso #   0.02          Basophil%   0.3         Bilirubin (UA)     2+  Comment:  Positive urine bilirubin is not confirmed. Correlate with   serum bilirubin and clinical presentation.         BILIRUBIN TOTAL   2.4  Comment:  For infants and newborns, interpretation of results should be based  on gestational age, weight and in agreement with clinical  observations.  Premature Infant recommended reference ranges:  Up to 24 hours.............<8.0 mg/dL  Up to 48 hours............<12.0 mg/dL  3-5 days..................<15.0 mg/dL  6-29 days.................<15.0 mg/dL           BUN, Bld 3 4         Calcium 9.3 9.9         Chloride 105 103         CO2 19 16         Color, UA     Yellow       Creatinine 0.7 0.7         Differential Method   Automated         eGFR if  >60 >60         eGFR if non  >60  Comment:  Calculation used to obtain the estimated glomerular filtration  rate (eGFR) is the CKD-EPI equation.    >60  Comment:  Calculation used to obtain the estimated glomerular filtration  rate (eGFR) is the CKD-EPI equation.            Eos #   0.0         Eosinophil%   0.1         Glucose 109 79         Glucose, UA     Negative       Gran # (ANC)   5.1         Gran%   68.5         Hematocrit   41.0         Hemoglobin   13.8         Hyaline Casts, UA     7       Immature Grans (Abs)   0.02  Comment:  Mild elevation in immature granulocytes is non specific and   can be seen in a variety of conditions including stress response,   acute inflammation, trauma and pregnancy. Correlation with other   laboratory and clinical findings is essential.           Immature Granulocytes   0.3         Ketones, UA     3+       Leukocytes, UA     Trace       Lipase   42         Lymph #   1.7         Lymph%   23.0         Magnesium 1.6 1.7         MCH   28.8         MCHC   33.7         MCV   85         Microscopic Comment     SEE COMMENT  Comment:  Other formed elements not mentioned in the report are not   present in the microscopic  examination.          Mono #   0.6         Mono%   7.8         MPV   11.3         NITRITE UA     Negative       nRBC   0         Occult Blood UA     Negative       pH, UA     6.0       Platelets   364         Potassium 2.7  Comment:  K critical result(s) called and verbal readback obtained from   Ras Basurto RN  by KB3 2019 05:37   3.7         Preg Test, Ur       Positive     PROTEIN TOTAL   8.1         Protein, UA     2+  Comment:  Recommend a 24 hour urine protein or a urine   protein/creatinine ratio if globulin induced proteinuria is  clinically suspected.         RBC   4.80         RBC, UA     0       RDW   13.3         Sodium 138 138         Specific Gravity, UA     >=1.030       Specimen UA     Urine, Clean Catch       Squam Epithel, UA     10       Trichomonas, UA     Occasional       UROBILINOGEN UA     2.0-3.0       WBC, UA     10       WBC   7.42               Physical Exam:   Constitutional: She is oriented to person, place, and time. She appears well-developed and well-nourished. No distress.       Cardiovascular: Normal rate, regular rhythm and normal heart sounds.     Pulmonary/Chest: Effort normal and breath sounds normal.        Abdominal: Soft. Bowel sounds are normal. She exhibits no distension. There is no tenderness.             Musculoskeletal: Normal range of motion and moves all extremeties. She exhibits no edema or tenderness.       Neurological: She is alert and oriented to person, place, and time.    Skin: Skin is warm and dry.    Psychiatric: She has a normal mood and affect. Her behavior is normal. Thought content normal.       Assessment/Plan:     19 y.o. female  at 9w1d for:    * Hyperemesis gravidarum  HD # 1  Continue with IV hydration and antiemetics.  Will also continue NPO until pt without N/V.  Pt counseled on management plan and admission goals.    Hypokalemia  IV Potassium ordered.    Trichomonas infection  Do not recommend treatment at this time.  Will wait  until second trimester for treatment.          Anand Ellison MD  Obstetrics  [facility]

## 2019-12-25 NOTE — ASSESSMENT & PLAN NOTE
HD # 1  Continue with IV hydration and antiemetics.  Will also continue NPO until pt without N/V.  Pt counseled on management plan and admission goals.

## 2019-12-25 NOTE — ED PROVIDER NOTES
SCRIBE #1 NOTE: IColeen, am scribing for, and in the presence of, Agnes Ortiz Do, MD. I have scribed the HPI, STACI, PEx.    SCRIBE #2 NOTE: I, Gonzales Martinez, am scribing for, and in the presence of,  Marlene Carrasco DO. I have scribed the remaining portions of the note not scribed by Scribe #1.      History     Chief Complaint   Patient presents with    Emesis     Pt states some blood in vomit today. Hx of HG.      Review of patient's allergies indicates:  No Known Allergies      History of Present Illness     HPI    12/24/2019, 6:47 PM  History obtained from the patient      History of Present Illness: Yecenia Clarke is a 19 y.o. female patient 9 weeks gestation who presents to the Emergency Department for evaluation of N/V which onset suddenly several days ago. Patient was evaluated in the ED on 12/22, had blood work, and was put on Macrobid for UTI and Reglan. She presented to the ED yesterday for N/V again and had blood work. She was prescribed Phenergan suppositories, Zofran, and potassium. She was given IVF both times. Patient states that she still has been unable to tolerate liquid and solid PO and today she noticed some blood mixed in her vomit. Sxs are episodic and moderate in severity. No mitigating or exacerbating factors reported. Patient has no c/o fever, chills, diarrhea, constipation, abd pain, vaginal bleeding, dizziness, lightheadedness, and all other sxs at this time.       Arrival mode: Personal vehicle    PCP: Primary Doctor No        Past Medical History:  History reviewed. No pertinent medical history.    Past Surgical History:  History reviewed. No pertinent surgical history.    Family History:  History reviewed. No pertinent family history.    Social History:  Social History     Tobacco Use    Smoking status: Never Smoker    Smokeless tobacco: Never Used   Substance and Sexual Activity    Alcohol use: Yes    Drug use: Never    Sexual activity: Yes        Review of  Systems     Review of Systems   Constitutional: Negative for chills and fever.   HENT: Negative for sore throat.    Respiratory: Negative for shortness of breath.    Cardiovascular: Negative for chest pain.   Gastrointestinal: Positive for nausea and vomiting. Negative for abdominal pain, constipation and diarrhea.        (+) blood mixed in vomit   Genitourinary: Negative for dysuria and vaginal bleeding.   Musculoskeletal: Negative for back pain.   Skin: Negative for rash.   Neurological: Negative for dizziness, weakness and light-headedness.   Hematological: Does not bruise/bleed easily.   All other systems reviewed and are negative.     Physical Exam     Initial Vitals [12/24/19 1813]   BP Pulse Resp Temp SpO2   (!) 141/84 103 19 98.6 °F (37 °C) 98 %      MAP       --          Physical Exam  Nursing Notes and Vital Signs Reviewed.  Constitutional: Patient is in no acute distress. Well-developed. Obese.  Head: Atraumatic. Normocephalic.  Eyes: EOM intact. No scleral icterus.  ENT: Mucous membranes are moist. Oropharynx is clear and symmetric.    Neck: Supple. Full ROM.   Cardiovascular: Regular rate. Regular rhythm. No murmurs, rubs, or gallops. Distal pulses are 2+ and symmetric.  Pulmonary/Chest: No respiratory distress. Clear to auscultation bilaterally. No wheezing or rales.  Abdominal: Soft and non-distended.  There is no tenderness.  No rebound, guarding, or rigidity. Good bowel sounds.  Genitourinary: No CVA tenderness  Musculoskeletal: Moves all extremities. No obvious deformities.  Skin: Warm and dry.  Neurological:  Alert, awake, and appropriate.  Normal speech.  No acute focal neurological deficits are appreciated.  Psychiatric: Normal affect. Good eye contact. Appropriate in content.     ED Course   Procedures  ED Vital Signs:  Vitals:    12/24/19 1813 12/24/19 1935 12/24/19 2100 12/24/19 2300   BP: (!) 141/84 (!) 126/58 121/66 (!) 149/86   Pulse: 103 78 73 74   Resp: 19 18     Temp: 98.6 °F (37 °C)       TempSrc: Oral      SpO2: 98% 100% 100% 100%   Weight: 119.3 kg (262 lb 14.4 oz)          Abnormal Lab Results:  Labs Reviewed   URINALYSIS - Abnormal; Notable for the following components:       Result Value    Specific Gravity, UA >=1.030 (*)     Protein, UA 2+ (*)     Ketones, UA 3+ (*)     Bilirubin (UA) 2+ (*)     Urobilinogen, UA 2.0-3.0 (*)     Leukocytes, UA Trace (*)     All other components within normal limits   PREGNANCY TEST, URINE RAPID - Abnormal; Notable for the following components:    Preg Test, Ur Positive (*)     All other components within normal limits   CBC W/ AUTO DIFFERENTIAL - Abnormal; Notable for the following components:    Platelets 364 (*)     All other components within normal limits   COMPREHENSIVE METABOLIC PANEL - Abnormal; Notable for the following components:    CO2 16 (*)     BUN, Bld 4 (*)     Total Bilirubin 2.4 (*)     ALT 60 (*)     Anion Gap 19 (*)     All other components within normal limits   URINALYSIS MICROSCOPIC - Abnormal; Notable for the following components:    WBC, UA 10 (*)     Bacteria Few (*)     Hyaline Casts, UA 7 (*)     Trichomonas, UA Occasional (*)     All other components within normal limits   LIPASE   MAGNESIUM   MAGNESIUM   DRUG SCREEN PANEL, URINE EMERGENCY   BASIC METABOLIC PANEL   MAGNESIUM        All Lab Results:  Results for orders placed or performed during the hospital encounter of 12/24/19   Urinalysis Clean Catch   Result Value Ref Range    Specimen UA Urine, Clean Catch     Color, UA Yellow Yellow, Straw, Olga    Appearance, UA Clear Clear    pH, UA 6.0 5.0 - 8.0    Specific Gravity, UA >=1.030 (A) 1.005 - 1.030    Protein, UA 2+ (A) Negative    Glucose, UA Negative Negative    Ketones, UA 3+ (A) Negative    Bilirubin (UA) 2+ (A) Negative    Occult Blood UA Negative Negative    Nitrite, UA Negative Negative    Urobilinogen, UA 2.0-3.0 (A) <2.0 EU/dL    Leukocytes, UA Trace (A) Negative   Pregnancy, urine rapid (UPT)   Result Value Ref  Range    Preg Test, Ur Positive (A)    CBC W/ AUTO DIFFERENTIAL   Result Value Ref Range    WBC 7.42 3.90 - 12.70 K/uL    RBC 4.80 4.00 - 5.40 M/uL    Hemoglobin 13.8 12.0 - 16.0 g/dL    Hematocrit 41.0 37.0 - 48.5 %    Mean Corpuscular Volume 85 82 - 98 fL    Mean Corpuscular Hemoglobin 28.8 27.0 - 31.0 pg    Mean Corpuscular Hemoglobin Conc 33.7 32.0 - 36.0 g/dL    RDW 13.3 11.5 - 14.5 %    Platelets 364 (H) 150 - 350 K/uL    MPV 11.3 9.2 - 12.9 fL    Immature Granulocytes 0.3 0.0 - 0.5 %    Gran # (ANC) 5.1 1.8 - 7.7 K/uL    Immature Grans (Abs) 0.02 0.00 - 0.04 K/uL    Lymph # 1.7 1.0 - 4.8 K/uL    Mono # 0.6 0.3 - 1.0 K/uL    Eos # 0.0 0.0 - 0.5 K/uL    Baso # 0.02 0.00 - 0.20 K/uL    nRBC 0 0 /100 WBC    Gran% 68.5 38.0 - 73.0 %    Lymph% 23.0 18.0 - 48.0 %    Mono% 7.8 4.0 - 15.0 %    Eosinophil% 0.1 0.0 - 8.0 %    Basophil% 0.3 0.0 - 1.9 %    Differential Method Automated    Comp. Metabolic Panel   Result Value Ref Range    Sodium 138 136 - 145 mmol/L    Potassium 3.7 3.5 - 5.1 mmol/L    Chloride 103 95 - 110 mmol/L    CO2 16 (L) 23 - 29 mmol/L    Glucose 79 70 - 110 mg/dL    BUN, Bld 4 (L) 6 - 20 mg/dL    Creatinine 0.7 0.5 - 1.4 mg/dL    Calcium 9.9 8.7 - 10.5 mg/dL    Total Protein 8.1 6.0 - 8.4 g/dL    Albumin 3.7 3.5 - 5.2 g/dL    Total Bilirubin 2.4 (H) 0.1 - 1.0 mg/dL    Alkaline Phosphatase 113 55 - 135 U/L    AST 37 10 - 40 U/L    ALT 60 (H) 10 - 44 U/L    Anion Gap 19 (H) 8 - 16 mmol/L    eGFR if African American >60 >60 mL/min/1.73 m^2    eGFR if non African American >60 >60 mL/min/1.73 m^2   Lipase   Result Value Ref Range    Lipase 42 4 - 60 U/L   Urinalysis Microscopic   Result Value Ref Range    RBC, UA 0 0 - 4 /hpf    WBC, UA 10 (H) 0 - 5 /hpf    Bacteria Few (A) None-Occ /hpf    Squam Epithel, UA 10 /hpf    Hyaline Casts, UA 7 (A) 0-1/lpf /lpf    Trichomonas, UA Occasional (A) None    Microscopic Comment SEE COMMENT    Magnesium   Result Value Ref Range    Magnesium 1.7 1.6 - 2.6 mg/dL          Imaging Results:  Imaging Results    None                   The Emergency Provider reviewed the vital signs and test results, which are outlined above.     ED Discussion     7:00 PM: Dr. Garcia transfers care of pt to Dr. Carrasco pending lab results.    9:25 PM: Re-evaluated pt. Pt reports she is still nauseated    11:30 PM: Re-evaluated pt. Pt was holding emesis bag when we entered the room. Pt states she just vomited again, but is not in pain. D/w pt all pertinent results. D/w pt any concerns expressed at this time. Answered all questions. Pt expresses understanding at this time.    11:36 PM: Discussed pt's case with Dr. Ellison (OB/GYN).    12:03 AM: Discussed case with Dr. Ellison (Hospital Medicine). Dr. Ellison agrees with current care and management of pt and accepts admission.   Admitting Service: Hospital Medicine  Admitting Physician: Dr. Ellison  Admit to: Obs    12:04 AM: Re-evaluated pt. I have discussed test results, shared treatment plan, and the need for admission with patient and family at bedside. Pt and family express understanding at this time and agree with all information. All questions answered. Pt and family have no further questions or concerns at this time. Pt is ready for admit.                 Medical Decision Making:   Clinical Tests:   Lab Tests: Ordered and Reviewed           ED Medication(s):  Medications   ondansetron injection 4 mg (has no administration in time range)   ondansetron disintegrating tablet 8 mg (has no administration in time range)   promethazine (PHENERGAN) 12.5 mg in dextrose 5 % 50 mL IVPB (has no administration in time range)   dextrose 5 % in lactated ringers infusion (has no administration in time range)   scopolamine 1.3-1.5 mg (1 mg over 3 days) 1 patch (1 patch Transdermal Patch Applied 12/25/19 0104)   sodium chloride 0.9% bolus 1,000 mL (0 mLs Intravenous Stopped 12/24/19 2146)   ondansetron injection 4 mg (4 mg Intravenous Given by Other 12/24/19 1929)    sodium chloride 0.9% bolus 1,000 mL (0 mLs Intravenous Stopped 12/24/19 2146)   cefTRIAXone (ROCEPHIN) 1 g in dextrose 5 % 50 mL IVPB (0 g Intravenous Stopped 12/24/19 2015)   potassium chloride 10% oral solution 40 mEq (40 mEq Oral Given 12/24/19 2139)   promethazine (PHENERGAN) 12.5 mg in dextrose 5 % 50 mL IVPB (0 mg Intravenous Stopped 12/24/19 2200)   potassium chloride SA CR tablet 40 mEq (40 mEq Oral Given 12/24/19 2158)       Current Discharge Medication List                  Scribe Attestation:   Scribe #1: I performed the above scribed service and the documentation accurately describes the services I performed. I attest to the accuracy of the note.     Attending:   Physician Attestation Statement for Scribe #1: I, Agnes Ortiz Do, MD, personally performed the services described in this documentation, as scribed by Coleen Samaniego, in my presence, and it is both accurate and complete.       Scribe Attestation:   Scribe #2: I performed the above scribed service and the documentation accurately describes the services I performed. I attest to the accuracy of the note.    Attending Attestation:           Physician Attestation for Scribe:    Physician Attestation Statement for Scribe #2: I, Marlene Carrasco DO, reviewed documentation, as scribed by Gonzales Martinez in my presence, and it is both accurate and complete. I also acknowledge and confirm the content of the note done by Julesibe #1.           Clinical Impression       ICD-10-CM ICD-9-CM   1. Hyperemesis gravidarum O21.0 643.00   2. Vaginitis due to Trichomonas A59.01 131.01   3. Hyperemesis R11.10 536.2       Disposition:   Disposition: Placed in Observation  Condition: Rodrigo Carrasco DO  12/25/19 0121

## 2019-12-25 NOTE — SUBJECTIVE & OBJECTIVE
Obstetric HPI:  Pt reports feeling a little improved, but still reports feeling nauseated.  Had an episode of emesis this morning.  Denies abdominal pains or fevers.  Is NPO.     Objective:     Vital Signs (Most Recent):  Temp: 97.7 °F (36.5 °C) (12/25/19 0734)  Pulse: 76 (12/25/19 0734)  Resp: 14 (12/25/19 0734)  BP: 107/64 (12/25/19 0734)  SpO2: 100 % (12/25/19 0734) Vital Signs (24h Range):  Temp:  [97.7 °F (36.5 °C)-98.6 °F (37 °C)] 97.7 °F (36.5 °C)  Pulse:  [] 76  Resp:  [14-19] 14  SpO2:  [97 %-100 %] 100 %  BP: (107-149)/(58-86) 107/64     Weight: 119 kg (262 lb 5.6 oz)  Body mass index is 47.98 kg/m².      Intake/Output Summary (Last 24 hours) at 12/25/2019 1004  Last data filed at 12/25/2019 0800  Gross per 24 hour   Intake 2050 ml   Output --   Net 2050 ml       Significant Labs:  Recent Lab Results       12/25/19  0401   12/24/19  1937   12/24/19  1839   12/24/19  1838        Albumin   3.7         Alkaline Phosphatase   113         ALT   60         Anion Gap 14 19         Appearance, UA     Clear       AST   37         Bacteria, UA     Few       Baso #   0.02         Basophil%   0.3         Bilirubin (UA)     2+  Comment:  Positive urine bilirubin is not confirmed. Correlate with   serum bilirubin and clinical presentation.         BILIRUBIN TOTAL   2.4  Comment:  For infants and newborns, interpretation of results should be based  on gestational age, weight and in agreement with clinical  observations.  Premature Infant recommended reference ranges:  Up to 24 hours.............<8.0 mg/dL  Up to 48 hours............<12.0 mg/dL  3-5 days..................<15.0 mg/dL  6-29 days.................<15.0 mg/dL           BUN, Bld 3 4         Calcium 9.3 9.9         Chloride 105 103         CO2 19 16         Color, UA     Yellow       Creatinine 0.7 0.7         Differential Method   Automated         eGFR if  >60 >60         eGFR if non  >60  Comment:  Calculation used to  obtain the estimated glomerular filtration  rate (eGFR) is the CKD-EPI equation.    >60  Comment:  Calculation used to obtain the estimated glomerular filtration  rate (eGFR) is the CKD-EPI equation.            Eos #   0.0         Eosinophil%   0.1         Glucose 109 79         Glucose, UA     Negative       Gran # (ANC)   5.1         Gran%   68.5         Hematocrit   41.0         Hemoglobin   13.8         Hyaline Casts, UA     7       Immature Grans (Abs)   0.02  Comment:  Mild elevation in immature granulocytes is non specific and   can be seen in a variety of conditions including stress response,   acute inflammation, trauma and pregnancy. Correlation with other   laboratory and clinical findings is essential.           Immature Granulocytes   0.3         Ketones, UA     3+       Leukocytes, UA     Trace       Lipase   42         Lymph #   1.7         Lymph%   23.0         Magnesium 1.6 1.7         MCH   28.8         MCHC   33.7         MCV   85         Microscopic Comment     SEE COMMENT  Comment:  Other formed elements not mentioned in the report are not   present in the microscopic examination.          Mono #   0.6         Mono%   7.8         MPV   11.3         NITRITE UA     Negative       nRBC   0         Occult Blood UA     Negative       pH, UA     6.0       Platelets   364         Potassium 2.7  Comment:  K critical result(s) called and verbal readback obtained from   Ras Basurto RN  by KB3 12/25/2019 05:37   3.7         Preg Test, Ur       Positive     PROTEIN TOTAL   8.1         Protein, UA     2+  Comment:  Recommend a 24 hour urine protein or a urine   protein/creatinine ratio if globulin induced proteinuria is  clinically suspected.         RBC   4.80         RBC, UA     0       RDW   13.3         Sodium 138 138         Specific Gravity, UA     >=1.030       Specimen UA     Urine, Clean Catch       Squam Epithel, UA     10       Trichomonas, UA     Occasional       UROBILINOGEN UA      2.0-3.0       WBC, UA     10       WBC   7.42               Physical Exam:   Constitutional: She is oriented to person, place, and time. She appears well-developed and well-nourished. No distress.       Cardiovascular: Normal rate, regular rhythm and normal heart sounds.     Pulmonary/Chest: Effort normal and breath sounds normal.        Abdominal: Soft. Bowel sounds are normal. She exhibits no distension. There is no tenderness.             Musculoskeletal: Normal range of motion and moves all extremeties. She exhibits no edema or tenderness.       Neurological: She is alert and oriented to person, place, and time.    Skin: Skin is warm and dry.    Psychiatric: She has a normal mood and affect. Her behavior is normal. Thought content normal.

## 2019-12-25 NOTE — ASSESSMENT & PLAN NOTE
Pt unable to tolerate PO challenge in ER.  Admit for observation, IV hydration, and antiemetics.  Will keep NPO for now.  Pt counseled on management plan and admission goals.

## 2019-12-25 NOTE — H&P
[facility] -   Obstetrics  History & Physical    Patient Name: Yecenia Clarke  MRN: [id number]  Admission Date: 2019  Primary Care Provider: Primary Doctor No    Subjective:     Principal Problem:Hyperemesis gravidarum    History of Present Illness:  18 yo  with IUP at approximately 9 WGA.  Pt presented to ER with complaints of persistent nausea and vomiting.  Pt unable to tolerate solids or liquids for 3-4 days now.  Denies fevers, abdominal pain, diarrhea, constipation, dysuria, vaginal bleeding.  Reports no weight loss yet.  Did not experience this with prior pregnancy.  Pt has presented to [facility] on several occasions for same over the past week.  Was seen by Dr. Owens at .    Obstetric HPI:    This pregnancy has been complicated by Hyperemesis    OB History    Para Term  AB Living   1 0 0 0 0 0   SAB TAB Ectopic Multiple Live Births   0 0 0 0 0      # Outcome Date GA Lbr Eddi/2nd Weight Sex Delivery Anes PTL Lv   1 Current              No past medical history on file.  No past surgical history on file.      (Not in a hospital admission)    Review of patient's allergies indicates:  No Known Allergies     Family History     None        Tobacco Use    Smoking status: Never Smoker    Smokeless tobacco: Never Used   Substance and Sexual Activity    Alcohol use: Yes    Drug use: Never    Sexual activity: Yes     Review of Systems   Constitutional: Positive for appetite change and fatigue. Negative for activity change, chills, fever and unexpected weight change.   Respiratory: Negative for shortness of breath.    Cardiovascular: Negative for chest pain, palpitations and leg swelling.   Gastrointestinal: Positive for nausea and vomiting. Negative for abdominal pain, bloating, blood in stool, constipation and diarrhea.   Genitourinary: Negative for dysuria, flank pain, frequency, genital sores, hematuria, pelvic pain, urgency, vaginal  bleeding, vaginal discharge, vaginal pain, urinary incontinence, vaginal dryness and vaginal odor.   Musculoskeletal: Negative for back pain.   Integumentary:  Negative for breast mass, nipple discharge, breast skin changes and breast tenderness.   Neurological: Negative for syncope and headaches.   Breast: Negative for asymmetry, lump, mass, mastodynia, nipple discharge, skin changes and tenderness     Objective:     Vital Signs (Most Recent):  Temp: 98.6 °F (37 °C) (12/24/19 1813)  Pulse: 73 (12/24/19 2100)  Resp: 18 (12/24/19 1935)  BP: 121/66 (12/24/19 2100)  SpO2: 100 % (12/24/19 2100) Vital Signs (24h Range):  Temp:  [98.6 °F (37 °C)] 98.6 °F (37 °C)  Pulse:  [] 73  Resp:  [18-19] 18  SpO2:  [98 %-100 %] 100 %  BP: (121-141)/(58-84) 121/66     Weight: 119.3 kg (262 lb 14.4 oz)  Body mass index is 48.08 kg/m².      Physical Exam:   Constitutional: She is oriented to person, place, and time. She appears well-developed and well-nourished. No distress.    HENT:   Head: Normocephalic and atraumatic.    Eyes: Pupils are equal, round, and reactive to light. EOM are normal.    Neck: Normal range of motion.    Cardiovascular: Normal rate, regular rhythm and normal heart sounds.     Pulmonary/Chest: Effort normal and breath sounds normal.        Abdominal: Soft. Bowel sounds are normal. She exhibits no distension. There is no tenderness.             Musculoskeletal: Normal range of motion and moves all extremeties. She exhibits no edema or tenderness.       Neurological: She is alert and oriented to person, place, and time.    Skin: Skin is warm and dry.    Psychiatric: She has a normal mood and affect. Her behavior is normal. Thought content normal.       Significant Labs:  No results found for: GROUPTRH, HEPBSAG, RUBELLAIGGSC, STREPBCULT, AFP, RJIFAZG6EV    CBC:   Recent Labs   Lab 12/24/19 1937   WBC 7.42   RBC 4.80   HGB 13.8   HCT 41.0   *   MCV 85   MCH 28.8   MCHC 33.7     CMP:   Recent Labs   Lab  19   GLU 79   CALCIUM 9.9   ALBUMIN 3.7   PROT 8.1      K 3.7   CO2 16*      BUN 4*   CREATININE 0.7   ALKPHOS 113   ALT 60*   AST 37   BILITOT 2.4*     I have personallly reviewed all pertinent lab results from the last 24 hours.    Assessment/Plan:     19 y.o. female  at 9w1d for:    * Hyperemesis gravidarum  Pt unable to tolerate PO challenge in ER.  Admit for observation, IV hydration, and antiemetics.  Will keep NPO for now.  Pt counseled on management plan and admission goals.    Trichomonas infection  Do not recommend treatment at this time.  Will wait until second trimester for treatment.        Anand Ellison MD  Obstetrics  [facility] -

## 2019-12-25 NOTE — ED NOTES
No vomiting noted since arrival to ED but patient does verbalize that she is nauseated. MD notified.

## 2019-12-25 NOTE — PLAN OF CARE
Discussed poc with pt, still NPO , ivfs infusing,. Potassium replacement done, remains injury free fall precautions in place, call light within reach, no acute distress noted, vs wnl, chart check complete will cont to monitor

## 2019-12-26 LAB
ANION GAP SERPL CALC-SCNC: 11 MMOL/L (ref 8–16)
BUN SERPL-MCNC: 2 MG/DL (ref 6–20)
CALCIUM SERPL-MCNC: 9.1 MG/DL (ref 8.7–10.5)
CHLORIDE SERPL-SCNC: 104 MMOL/L (ref 95–110)
CO2 SERPL-SCNC: 22 MMOL/L (ref 23–29)
CREAT SERPL-MCNC: 0.7 MG/DL (ref 0.5–1.4)
EST. GFR  (AFRICAN AMERICAN): >60 ML/MIN/1.73 M^2
EST. GFR  (NON AFRICAN AMERICAN): >60 ML/MIN/1.73 M^2
GLUCOSE SERPL-MCNC: 109 MG/DL (ref 70–110)
POTASSIUM SERPL-SCNC: 2.7 MMOL/L (ref 3.5–5.1)
POTASSIUM SERPL-SCNC: 2.7 MMOL/L (ref 3.5–5.1)
SODIUM SERPL-SCNC: 137 MMOL/L (ref 136–145)

## 2019-12-26 PROCEDURE — 96361 HYDRATE IV INFUSION ADD-ON: CPT

## 2019-12-26 PROCEDURE — S0028 INJECTION, FAMOTIDINE, 20 MG: HCPCS | Performed by: OBSTETRICS & GYNECOLOGY

## 2019-12-26 PROCEDURE — 25000003 PHARM REV CODE 250: Performed by: OBSTETRICS & GYNECOLOGY

## 2019-12-26 PROCEDURE — G0378 HOSPITAL OBSERVATION PER HR: HCPCS

## 2019-12-26 PROCEDURE — 36415 COLL VENOUS BLD VENIPUNCTURE: CPT

## 2019-12-26 PROCEDURE — 63600175 PHARM REV CODE 636 W HCPCS: Performed by: EMERGENCY MEDICINE

## 2019-12-26 PROCEDURE — 84132 ASSAY OF SERUM POTASSIUM: CPT

## 2019-12-26 PROCEDURE — 63600175 PHARM REV CODE 636 W HCPCS: Performed by: OBSTETRICS & GYNECOLOGY

## 2019-12-26 PROCEDURE — 11000001 HC ACUTE MED/SURG PRIVATE ROOM

## 2019-12-26 PROCEDURE — 96367 TX/PROPH/DG ADDL SEQ IV INF: CPT

## 2019-12-26 PROCEDURE — 96376 TX/PRO/DX INJ SAME DRUG ADON: CPT

## 2019-12-26 PROCEDURE — 80048 BASIC METABOLIC PNL TOTAL CA: CPT

## 2019-12-26 RX ORDER — POTASSIUM CHLORIDE 7.45 MG/ML
10 INJECTION INTRAVENOUS
Status: COMPLETED | OUTPATIENT
Start: 2019-12-26 | End: 2019-12-26

## 2019-12-26 RX ORDER — POTASSIUM CHLORIDE 20 MEQ/1
40 TABLET, EXTENDED RELEASE ORAL ONCE
Status: DISCONTINUED | OUTPATIENT
Start: 2019-12-26 | End: 2019-12-26

## 2019-12-26 RX ORDER — POTASSIUM CHLORIDE 20 MEQ/1
40 TABLET, EXTENDED RELEASE ORAL ONCE
Status: COMPLETED | OUTPATIENT
Start: 2019-12-26 | End: 2019-12-26

## 2019-12-26 RX ADMIN — ONDANSETRON 4 MG: 2 INJECTION INTRAMUSCULAR; INTRAVENOUS at 04:12

## 2019-12-26 RX ADMIN — DEXTROSE, SODIUM CHLORIDE, SODIUM LACTATE, POTASSIUM CHLORIDE, AND CALCIUM CHLORIDE: 5; .6; .31; .03; .02 INJECTION, SOLUTION INTRAVENOUS at 08:12

## 2019-12-26 RX ADMIN — POTASSIUM CHLORIDE 10 MEQ: 7.46 INJECTION, SOLUTION INTRAVENOUS at 08:12

## 2019-12-26 RX ADMIN — POTASSIUM CHLORIDE 40 MEQ: 20 TABLET, EXTENDED RELEASE ORAL at 09:12

## 2019-12-26 RX ADMIN — FAMOTIDINE 20 MG: 20 INJECTION, SOLUTION INTRAVENOUS at 08:12

## 2019-12-26 RX ADMIN — POTASSIUM CHLORIDE 40 MEQ: 20 TABLET, EXTENDED RELEASE ORAL at 08:12

## 2019-12-26 RX ADMIN — POTASSIUM CHLORIDE 10 MEQ: 7.46 INJECTION, SOLUTION INTRAVENOUS at 09:12

## 2019-12-26 RX ADMIN — PROMETHAZINE HYDROCHLORIDE 12.5 MG: 25 INJECTION INTRAMUSCULAR; INTRAVENOUS at 08:12

## 2019-12-26 NOTE — PLAN OF CARE
Remains free from harm, no c/o pain, positions self, no acute distress noted. 24 hour chart check complete.

## 2019-12-27 LAB — POTASSIUM SERPL-SCNC: 2.7 MMOL/L (ref 3.5–5.1)

## 2019-12-27 PROCEDURE — 96374 THER/PROPH/DIAG INJ IV PUSH: CPT | Mod: 59

## 2019-12-27 PROCEDURE — 25000003 PHARM REV CODE 250: Performed by: OBSTETRICS & GYNECOLOGY

## 2019-12-27 PROCEDURE — 63600175 PHARM REV CODE 636 W HCPCS: Performed by: PHYSICIAN ASSISTANT

## 2019-12-27 PROCEDURE — 11000001 HC ACUTE MED/SURG PRIVATE ROOM

## 2019-12-27 PROCEDURE — 84132 ASSAY OF SERUM POTASSIUM: CPT

## 2019-12-27 PROCEDURE — 36415 COLL VENOUS BLD VENIPUNCTURE: CPT

## 2019-12-27 PROCEDURE — 96376 TX/PRO/DX INJ SAME DRUG ADON: CPT

## 2019-12-27 PROCEDURE — 63600175 PHARM REV CODE 636 W HCPCS: Performed by: OBSTETRICS & GYNECOLOGY

## 2019-12-27 PROCEDURE — S0028 INJECTION, FAMOTIDINE, 20 MG: HCPCS | Performed by: OBSTETRICS & GYNECOLOGY

## 2019-12-27 PROCEDURE — 63600175 PHARM REV CODE 636 W HCPCS: Performed by: EMERGENCY MEDICINE

## 2019-12-27 PROCEDURE — 96361 HYDRATE IV INFUSION ADD-ON: CPT

## 2019-12-27 RX ORDER — POTASSIUM CHLORIDE 7.45 MG/ML
10 INJECTION INTRAVENOUS
Status: COMPLETED | OUTPATIENT
Start: 2019-12-27 | End: 2019-12-27

## 2019-12-27 RX ORDER — POTASSIUM CHLORIDE 7.45 MG/ML
10 INJECTION INTRAVENOUS
Status: DISCONTINUED | OUTPATIENT
Start: 2019-12-27 | End: 2019-12-27

## 2019-12-27 RX ORDER — DEXTROSE MONOHYDRATE, SODIUM CHLORIDE, AND POTASSIUM CHLORIDE 50; 1.49; 9 G/1000ML; G/1000ML; G/1000ML
INJECTION, SOLUTION INTRAVENOUS CONTINUOUS
Status: DISCONTINUED | OUTPATIENT
Start: 2019-12-27 | End: 2019-12-28 | Stop reason: HOSPADM

## 2019-12-27 RX ADMIN — POTASSIUM CHLORIDE 40 MEQ: 20 TABLET, EXTENDED RELEASE ORAL at 09:12

## 2019-12-27 RX ADMIN — POTASSIUM CHLORIDE 10 MEQ: 7.46 INJECTION, SOLUTION INTRAVENOUS at 01:12

## 2019-12-27 RX ADMIN — ONDANSETRON 4 MG: 2 INJECTION INTRAMUSCULAR; INTRAVENOUS at 02:12

## 2019-12-27 RX ADMIN — FAMOTIDINE 20 MG: 20 INJECTION, SOLUTION INTRAVENOUS at 09:12

## 2019-12-27 RX ADMIN — DEXTROSE MONOHYDRATE, SODIUM CHLORIDE, AND POTASSIUM CHLORIDE: 50; 9; 1.49 INJECTION, SOLUTION INTRAVENOUS at 01:12

## 2019-12-27 RX ADMIN — ONDANSETRON 4 MG: 2 INJECTION INTRAMUSCULAR; INTRAVENOUS at 07:12

## 2019-12-27 RX ADMIN — PROMETHAZINE HYDROCHLORIDE 12.5 MG: 25 INJECTION INTRAMUSCULAR; INTRAVENOUS at 09:12

## 2019-12-27 RX ADMIN — POTASSIUM CHLORIDE 10 MEQ: 7.46 INJECTION, SOLUTION INTRAVENOUS at 10:12

## 2019-12-27 RX ADMIN — ONDANSETRON 8 MG: 8 TABLET, ORALLY DISINTEGRATING ORAL at 08:12

## 2019-12-27 RX ADMIN — POTASSIUM CHLORIDE 10 MEQ: 7.46 INJECTION, SOLUTION INTRAVENOUS at 11:12

## 2019-12-27 NOTE — PROGRESS NOTES
[facility]  Obstetrics  Antepartum Progress Note    Patient Name: Yecenia Clarke  MRN: [id number]  Admission Date: 2019  Hospital Length of Stay: 0 days  Attending Physician: Essie Ortiz, *  Primary Care Provider: Primary Doctor No    Subjective:     Principal Problem:Hyperemesis gravidarum    HPI:  20 yo  with IUP at approximately 9 WGA.  Pt presented to ER with complaints of persistent nausea and vomiting.  Pt unable to tolerate solids or liquids for 3-4 days now.  Denies fevers, abdominal pain, diarrhea, constipation, dysuria, vaginal bleeding.  Reports no weight loss yet.  Did not experience this with prior pregnancy.  Pt has presented to [facility] on several occasions for same over the past week.  Was seen by Dr. Owens at .    Hospital Course:  Patient admitted for IV hydration and anti-emetic therapy.      Obstetric HPI:  Patient denies vaginal bleeding; no pelvic cramping or pain.  Patient seen at  for prenatal care.       Objective:     Vital Signs (Most Recent):  Temp: 98.3 °F (36.8 °C) (19 1140)  Pulse: 97 (19 1140)  Resp: 14 (19 1140)  BP: 117/67 (19 1140)  SpO2: 98 % (19 1140) Vital Signs (24h Range):  Temp:  [98.1 °F (36.7 °C)-98.7 °F (37.1 °C)] 98.3 °F (36.8 °C)  Pulse:  [87-98] 97  Resp:  [14-18] 14  SpO2:  [97 %-100 %] 98 %  BP: ()/(53-76) 117/67     Weight: 119 kg (262 lb 5.6 oz)  Body mass index is 47.98 kg/m².          Intake/Output Summary (Last 24 hours) at 2019 1424  Last data filed at 2019 0600  Gross per 24 hour   Intake 3125 ml   Output --   Net 3125 ml       Significant Labs:  Recent Lab Results       19  0649   19  1901        Potassium 2.7  Comment:  K   critical result(s) called and verbal readback obtained from   JASWINDER XIE RN by VALERIO 2019 07:31   2.7  Comment:  K  critical result(s) called and verbal readback obtained from Juani Diana RN  by AMR1 2019 19:39             Physical Exam:   Constitutional: She is oriented to person, place, and time. She appears well-developed and well-nourished. No distress.       Cardiovascular: Normal rate, regular rhythm and normal heart sounds.     Pulmonary/Chest: Effort normal and breath sounds normal.        Abdominal: Soft. Bowel sounds are normal. She exhibits no distension. There is no tenderness.             Musculoskeletal: Normal range of motion and moves all extremeties. She exhibits no edema or tenderness.       Neurological: She is alert and oriented to person, place, and time.    Skin: Skin is warm and dry.    Psychiatric: She has a normal mood and affect. Her behavior is normal. Thought content normal.       Assessment/Plan:     19 y.o. female  at 9w3d for:    * Hyperemesis gravidarum  HD # 2  Continue with IV hydration and antiemetics.  Patient has been tolerating clears for over 24 hours, but still having bouts of emesis.  Not able to advance diet due to nausea and intermittent vomiting.  Will continue with progressing diet as tolerated, with the potential need for additional nutrition via IV if continued caloric deficit.     Pt counseled on management plan and admission goals.    Hypokalemia  IV Potassium dosage frequency increased to 4 times of 10mEq today.  Patient has also been taking oral KCl tablets (40mEq BID) however, she thinks the oral tabs contribute or incite her N/V episodes.      Recheck K+ this evening or tomorrow am.  Monitor other electrolytes and replete as necessary.      Trichomonas infection  Do not recommend treatment at this time.  Will wait until second trimester for treatment ([facility] so she will need to follow up with her OB)          Dania Haines PA-C  Obstetrics  [facility]

## 2019-12-27 NOTE — PROGRESS NOTES
Rounded w pt around 6p. Pt reports tolerating some po but (+) emesis twice today. Repeat K levels (pt has been getting po K today). Pt would like to be d/c in AM. Will continue K due persistent hypokalemia

## 2019-12-27 NOTE — ASSESSMENT & PLAN NOTE
IV Potassium dosage frequency increased to 4 times of 10mEq today.  Patient has also been taking oral KCl tablets (40mEq BID) however, she thinks the oral tabs contribute or incite her N/V episodes.      Recheck K+ this evening or tomorrow am.  Monitor other electrolytes and replete as necessary.

## 2019-12-27 NOTE — HOSPITAL COURSE
Patient admitted for IV hydration and anti-emetic therapy.  Pt was noted to have significant hypokalemia and was started on K supplementation.  Hospital course was unremarkable and pt recovered well.  Pt was discharged on HD # 4 upon meeting all discharge criteria.  Pt was tolerating most liquids and soft solids prior to discharge.  Pt was counseled on warning sign instructions prior to her discharge.  Pt will follow uo with her OB at  (Dr. Owens) within one week.

## 2019-12-27 NOTE — ASSESSMENT & PLAN NOTE
Do not recommend treatment at this time.  Will wait until second trimester for treatment ([facility], so she will need to follow up with her OB)

## 2019-12-27 NOTE — NURSING
Pt had two emesis episodes over night. States she didn't want any medication for nausea/vomiting because after she vomited each time she felt fine. Will continue to monitor

## 2019-12-27 NOTE — ASSESSMENT & PLAN NOTE
HD # 2  Continue with IV hydration and antiemetics.  Patient has been tolerating clears for over 24 hours, but still having bouts of emesis.  Not able to advance diet due to nausea and intermittent vomiting.  Will continue with progressing diet as tolerated, with the potential need for additional nutrition via IV if continued caloric deficit.     Pt counseled on management plan and admission goals.

## 2019-12-27 NOTE — PLAN OF CARE
Discussed poc with pt, no episodes of vomiting, antiemetics being administered, potassium replacement done, ivfs now containing KCL mEq, full liquid diet tolerating well, remains injury free fall precautions in place, no acute distress noted, chart check complete will cont to monitor

## 2019-12-27 NOTE — SUBJECTIVE & OBJECTIVE
Obstetric HPI:  Patient denies vaginal bleeding; no pelvic cramping or pain.  Patient seen at  for prenatal care.       Objective:     Vital Signs (Most Recent):  Temp: 98.3 °F (36.8 °C) (12/27/19 1140)  Pulse: 97 (12/27/19 1140)  Resp: 14 (12/27/19 1140)  BP: 117/67 (12/27/19 1140)  SpO2: 98 % (12/27/19 1140) Vital Signs (24h Range):  Temp:  [98.1 °F (36.7 °C)-98.7 °F (37.1 °C)] 98.3 °F (36.8 °C)  Pulse:  [87-98] 97  Resp:  [14-18] 14  SpO2:  [97 %-100 %] 98 %  BP: ()/(53-76) 117/67     Weight: 119 kg (262 lb 5.6 oz)  Body mass index is 47.98 kg/m².          Intake/Output Summary (Last 24 hours) at 12/27/2019 1424  Last data filed at 12/27/2019 0600  Gross per 24 hour   Intake 3125 ml   Output --   Net 3125 ml       Significant Labs:  Recent Lab Results       12/27/19  0649   12/26/19  1901        Potassium 2.7  Comment:  K   critical result(s) called and verbal readback obtained from   JASWINDER XIE RN by AES 12/27/2019 07:31   2.7  Comment:  K  critical result(s) called and verbal readback obtained from Juani Diana RN by AMR1 12/26/2019 19:39             Physical Exam:   Constitutional: She is oriented to person, place, and time. She appears well-developed and well-nourished. No distress.       Cardiovascular: Normal rate, regular rhythm and normal heart sounds.     Pulmonary/Chest: Effort normal and breath sounds normal.        Abdominal: Soft. Bowel sounds are normal. She exhibits no distension. There is no tenderness.             Musculoskeletal: Normal range of motion and moves all extremeties. She exhibits no edema or tenderness.       Neurological: She is alert and oriented to person, place, and time.    Skin: Skin is warm and dry.    Psychiatric: She has a normal mood and affect. Her behavior is normal. Thought content normal.

## 2019-12-27 NOTE — PLAN OF CARE
No acute changes during the shift. VS remained stable. No signs of distress. Patient continues to have episodes of abdominal pain and nausea. Will continue to follow care plan as indicated. All needs met. Safety measures in place. Will continue to monitor.

## 2019-12-28 VITALS
BODY MASS INDEX: 48.28 KG/M2 | TEMPERATURE: 98 F | OXYGEN SATURATION: 100 % | HEART RATE: 115 BPM | WEIGHT: 262.38 LBS | RESPIRATION RATE: 16 BRPM | DIASTOLIC BLOOD PRESSURE: 69 MMHG | SYSTOLIC BLOOD PRESSURE: 119 MMHG | HEIGHT: 62 IN

## 2019-12-28 LAB
ANION GAP SERPL CALC-SCNC: 12 MMOL/L (ref 8–16)
BUN SERPL-MCNC: 3 MG/DL (ref 6–20)
CALCIUM SERPL-MCNC: 9.5 MG/DL (ref 8.7–10.5)
CHLORIDE SERPL-SCNC: 103 MMOL/L (ref 95–110)
CO2 SERPL-SCNC: 23 MMOL/L (ref 23–29)
CREAT SERPL-MCNC: 0.7 MG/DL (ref 0.5–1.4)
EST. GFR  (AFRICAN AMERICAN): >60 ML/MIN/1.73 M^2
EST. GFR  (NON AFRICAN AMERICAN): >60 ML/MIN/1.73 M^2
GLUCOSE SERPL-MCNC: 97 MG/DL (ref 70–110)
POTASSIUM SERPL-SCNC: 3 MMOL/L (ref 3.5–5.1)
SODIUM SERPL-SCNC: 138 MMOL/L (ref 136–145)

## 2019-12-28 PROCEDURE — 99238 HOSP IP/OBS DSCHRG MGMT 30/<: CPT | Mod: ,,, | Performed by: OBSTETRICS & GYNECOLOGY

## 2019-12-28 PROCEDURE — 36415 COLL VENOUS BLD VENIPUNCTURE: CPT

## 2019-12-28 PROCEDURE — 25000003 PHARM REV CODE 250: Performed by: OBSTETRICS & GYNECOLOGY

## 2019-12-28 PROCEDURE — 99238 PR HOSPITAL DISCHARGE DAY,<30 MIN: ICD-10-PCS | Mod: ,,, | Performed by: OBSTETRICS & GYNECOLOGY

## 2019-12-28 PROCEDURE — 80048 BASIC METABOLIC PNL TOTAL CA: CPT

## 2019-12-28 RX ORDER — POTASSIUM CHLORIDE 20 MEQ/1
20 TABLET, EXTENDED RELEASE ORAL 2 TIMES DAILY
Qty: 30 TABLET | Refills: 0 | Status: SHIPPED | OUTPATIENT
Start: 2019-12-28

## 2019-12-28 RX ORDER — SCOLOPAMINE TRANSDERMAL SYSTEM 1 MG/1
1 PATCH, EXTENDED RELEASE TRANSDERMAL
Qty: 6 PATCH | Refills: 1 | Status: SHIPPED | OUTPATIENT
Start: 2019-12-31

## 2019-12-28 RX ORDER — PROMETHAZINE HYDROCHLORIDE 25 MG/1
25 SUPPOSITORY RECTAL EVERY 6 HOURS PRN
Qty: 12 SUPPOSITORY | Refills: 1 | Status: SHIPPED | OUTPATIENT
Start: 2019-12-28

## 2019-12-28 RX ORDER — ONDANSETRON 4 MG/1
4 TABLET, FILM COATED ORAL EVERY 6 HOURS PRN
Qty: 15 TABLET | Refills: 1 | Status: SHIPPED | OUTPATIENT
Start: 2019-12-28

## 2019-12-28 RX ADMIN — SCOPALAMINE 1 PATCH: 1 PATCH, EXTENDED RELEASE TRANSDERMAL at 12:12

## 2019-12-28 NOTE — PROGRESS NOTES
[facility]  Obstetrics  Antepartum Progress Note    Patient Name: Yecenia Clarke  MRN: [id number]  Admission Date: 2019  Hospital Length of Stay: 1 days  Attending Physician: Essie Ortiz, *  Primary Care Provider: Primary Doctor No    Subjective:     Principal Problem:Hyperemesis gravidarum    HPI:  18 yo  with IUP at approximately 9 WGA.  Pt presented to ER with complaints of persistent nausea and vomiting.  Pt unable to tolerate solids or liquids for 3-4 days now.  Denies fevers, abdominal pain, diarrhea, constipation, dysuria, vaginal bleeding.  Reports no weight loss yet.  Did not experience this with prior pregnancy.  Pt has presented to [facility] on several occasions for same over the past week.  Was seen by Dr. Owens at .    Hospital Course:  Patient admitted for IV hydration and anti-emetic therapy.      Obstetric HPI:  Pt reports feeling better.  Has been able to tolerate most liquids and some soft solids.  Pt is ready to go home.  Denies pain, fevers.     Objective:     Vital Signs (Most Recent):  Temp: 98.3 °F (36.8 °C) (19)  Pulse: 98 (19)  Resp: 18 (19)  BP: 119/69 (19)  SpO2: 99 % (19) Vital Signs (24h Range):  Temp:  [98 °F (36.7 °C)-98.4 °F (36.9 °C)] 98.3 °F (36.8 °C)  Pulse:  [76-98] 98  Resp:  [14-18] 18  SpO2:  [98 %-99 %] 99 %  BP: (111-120)/(63-79) 119/69     Weight: 119 kg (262 lb 5.6 oz)  Body mass index is 47.98 kg/m².      Intake/Output Summary (Last 24 hours) at 2019 1047  Last data filed at 2019 0500  Gross per 24 hour   Intake 1353.34 ml   Output --   Net 1353.34 ml       Significant Labs:  Recent Lab Results       19  0742        Anion Gap 12     BUN, Bld 3     Calcium 9.5     Chloride 103     CO2 23     Creatinine 0.7     eGFR if  >60     eGFR if non  >60  Comment:  Calculation used to obtain the estimated  glomerular filtration  rate (eGFR) is the CKD-EPI equation.        Glucose 97     Potassium 3.0     Sodium 138           Physical Exam:   Constitutional: She is oriented to person, place, and time. She appears well-developed and well-nourished. No distress.       Cardiovascular: Normal rate, regular rhythm and normal heart sounds.     Pulmonary/Chest: Effort normal and breath sounds normal.        Abdominal: Soft. Bowel sounds are normal. She exhibits no distension. There is no tenderness.             Musculoskeletal: Normal range of motion and moves all extremeties. She exhibits no edema or tenderness.       Neurological: She is alert and oriented to person, place, and time.    Skin: Skin is warm and dry.    Psychiatric: She has a normal mood and affect. Her behavior is normal. Thought content normal.       Assessment/Plan:     19 y.o. female  at 9w4d for:    * Hyperemesis gravidarum  HD # 4  Pt is stable for discharge to home.  Is tolerating most liquids and soft solids.  Pt advised on warning signs and indications for returning to ER.  Pt advised to set up follow up with her OB at  (Dr. Owens) within one week of discharge.    Hypokalemia  K+ level improved.  Stable.    Trichomonas infection  Do not recommend treatment at this time.  Will wait until second trimester for treatment (patient seeks pnc at [facility], so she will need to follow up with her OB)          Anand Ellison MD  Obstetrics  [facility]

## 2019-12-28 NOTE — SUBJECTIVE & OBJECTIVE
Obstetric HPI:  Pt reports feeling better.  Has been able to tolerate most liquids and some soft solids.  Pt is ready to go home.  Denies pain, fevers.     Objective:     Vital Signs (Most Recent):  Temp: 98.3 °F (36.8 °C) (12/28/19 0714)  Pulse: 98 (12/28/19 0714)  Resp: 18 (12/28/19 0714)  BP: 119/69 (12/28/19 0714)  SpO2: 99 % (12/28/19 0714) Vital Signs (24h Range):  Temp:  [98 °F (36.7 °C)-98.4 °F (36.9 °C)] 98.3 °F (36.8 °C)  Pulse:  [76-98] 98  Resp:  [14-18] 18  SpO2:  [98 %-99 %] 99 %  BP: (111-120)/(63-79) 119/69     Weight: 119 kg (262 lb 5.6 oz)  Body mass index is 47.98 kg/m².      Intake/Output Summary (Last 24 hours) at 12/28/2019 1047  Last data filed at 12/28/2019 0500  Gross per 24 hour   Intake 1353.34 ml   Output --   Net 1353.34 ml       Significant Labs:  Recent Lab Results       12/28/19  0742        Anion Gap 12     BUN, Bld 3     Calcium 9.5     Chloride 103     CO2 23     Creatinine 0.7     eGFR if  >60     eGFR if non  >60  Comment:  Calculation used to obtain the estimated glomerular filtration  rate (eGFR) is the CKD-EPI equation.        Glucose 97     Potassium 3.0     Sodium 138           Physical Exam:   Constitutional: She is oriented to person, place, and time. She appears well-developed and well-nourished. No distress.       Cardiovascular: Normal rate, regular rhythm and normal heart sounds.     Pulmonary/Chest: Effort normal and breath sounds normal.        Abdominal: Soft. Bowel sounds are normal. She exhibits no distension. There is no tenderness.             Musculoskeletal: Normal range of motion and moves all extremeties. She exhibits no edema or tenderness.       Neurological: She is alert and oriented to person, place, and time.    Skin: Skin is warm and dry.    Psychiatric: She has a normal mood and affect. Her behavior is normal. Thought content normal.

## 2019-12-28 NOTE — DISCHARGE SUMMARY
[facility]   Obstetrics  Discharge Summary      Patient Name: Yecenia Clarke  MRN: [id number]  Admission Date: 2019  Hospital Length of Stay: 1 days  Discharge Date and Time:  2019 10:55 AM  Attending Physician: Essie Ortiz, *   Discharging Provider: Anand Ellison MD   Primary Care Provider: Primary Doctor No    HPI: 18 yo  with IUP at approximately 9 WGA.  Pt presented to ER with complaints of persistent nausea and vomiting.  Pt unable to tolerate solids or liquids for 3-4 days now.  Denies fevers, abdominal pain, diarrhea, constipation, dysuria, vaginal bleeding.  Reports no weight loss yet.  Did not experience this with prior pregnancy.  Pt has presented to [facility] on several occasions for same over the past week.  Was seen by Dr. Owens at .      * No surgery found *     Hospital Course:   Patient admitted for IV hydration and anti-emetic therapy.  Pt was noted to have significant hypokalemia and was started on K supplementation.  Hospital course was unremarkable and pt recovered well.  Pt was discharged on HD # 4 upon meeting all discharge criteria.  Pt was tolerating most liquids and soft solids prior to discharge.  Pt was counseled on warning sign instructions prior to her discharge.  Pt will follow uo with her OB at  (Dr. Owens) within one week.     Consults (From admission, onward)        Status Ordering Provider     Inpatient consult to Obstetrics / Gynecology  Once     Provider:  MD Erickson Castro LISA L.     IP consult to case management  Once     Provider:  (Not yet assigned)    ANAND Coyle          Final Active Diagnoses:    Diagnosis Date Noted POA    PRINCIPAL PROBLEM:  Hyperemesis gravidarum [O21.0] 2019 Yes    Trichomonas infection [A59.9] 2019 Yes    Hypokalemia [E87.6] 2019 No      Problems Resolved During this Admission:        Labs: All labs within  the past 24 hours have been reviewed    Feeding Method: N/A    Immunizations     None          This patient has no babies on file.  Pending Diagnostic Studies:     None          Discharged Condition: stable    Disposition: Home or Self Care    Follow Up:  Follow-up Information     Pat Owens MD In 1 week.    Specialty:  Obstetrics and Gynecology  Contact information:  [address] [place] [address] [phone]                 Patient Instructions:      Diet Adult Regular     Notify your health care provider if you experience any of the following:  increased confusion or weakness     Notify your health care provider if you experience any of the following:  persistent dizziness, light-headedness, or visual disturbances     Notify your health care provider if you experience any of the following:  worsening rash     Notify your health care provider if you experience any of the following:  severe persistent headache     Notify your health care provider if you experience any of the following:  difficulty breathing or increased cough     Notify your health care provider if you experience any of the following:  severe uncontrolled pain     Notify your health care provider if you experience any of the following:  persistent nausea and vomiting or diarrhea     Notify your health care provider if you experience any of the following:  temperature >100.4     Activity as tolerated     Medications:  Current Discharge Medication List      START taking these medications    Details   ondansetron (ZOFRAN) 4 MG tablet Take 1 tablet (4 mg total) by mouth every 6 (six) hours as needed for Nausea.  Qty: 15 tablet, Refills: 1    Associated Diagnoses: Hyperemesis gravidarum      potassium chloride SA (K-DUR,KLOR-CON) 20 MEQ tablet Take 1 tablet (20 mEq total) by mouth 2 (two) times daily.  Qty: 30 tablet, Refills: 0    Associated Diagnoses: Hypokalemia      scopolamine (TRANSDERM-SCOP) 1.3-1.5 mg (1 mg over 3 days) Place 1  patch onto the skin Every 3 (three) days.  Qty: 6 patch, Refills: 1    Associated Diagnoses: Hyperemesis gravidarum         CONTINUE these medications which have CHANGED    Details   promethazine (PHENERGAN) 25 MG suppository Place 1 suppository (25 mg total) rectally every 6 (six) hours as needed for Nausea.  Qty: 12 suppository, Refills: 1    Associated Diagnoses: Hyperemesis gravidarum         CONTINUE these medications which have NOT CHANGED    Details   metoclopramide HCl (REGLAN) 10 MG tablet Take 1 tablet (10 mg total) by mouth every 6 (six) hours as needed (nausea and vomiting).  Qty: 30 tablet, Refills: 0         STOP taking these medications       nitrofurantoin, macrocrystal-monohydrate, (MACROBID) 100 MG capsule Comments:   Reason for Stopping:         ondansetron (ZOFRAN-ODT) 4 MG TbDL Comments:   Reason for Stopping:         potassium chloride (KLOR-CON) 10 MEQ TbSR Comments:   Reason for Stopping:               Anand Ellison MD  Obstetrics  [facility]

## 2019-12-28 NOTE — ASSESSMENT & PLAN NOTE
HD # 4  Pt is stable for discharge to home.  Is tolerating most liquids and soft solids.  Pt advised on warning signs and indications for returning to ER.  Pt advised to set up follow up with her OB at  (Dr. Owens) within one week of discharge.

## 2019-12-28 NOTE — NURSING
Spoke with [facility], still unable to provide assistance with US IV start. JODI Ying notified as well.

## 2019-12-28 NOTE — NURSING
Spoke with [facility] ED, still unable to spare anyone for US IV start. [facility] aware.

## 2019-12-28 NOTE — PLAN OF CARE
Remains free from harm, no c/o pain, zofran administered x 1 this shift, positions self, still awaiting IV placement, will continue to monitor. 24 hour chart check complete.

## 2019-12-28 NOTE — NURSING
Pt's IV infiltrated during report. IV insertion attempt x 1 unsuccessful. Requested US start from ED. ED states they will come ASAP.

## 2020-11-10 ENCOUNTER — HOSPITAL ENCOUNTER (EMERGENCY)
Facility: HOSPITAL | Age: 20
Discharge: HOME | End: 2020-11-10
Attending: EMERGENCY MEDICINE
Payer: MEDICAID

## 2020-11-10 VITALS
OXYGEN SATURATION: 99 % | HEART RATE: 88 BPM | DIASTOLIC BLOOD PRESSURE: 92 MMHG | SYSTOLIC BLOOD PRESSURE: 133 MMHG | TEMPERATURE: 99 F | BODY MASS INDEX: 53.09 KG/M2 | RESPIRATION RATE: 20 BRPM | HEIGHT: 62 IN | WEIGHT: 288.5 LBS

## 2020-11-10 DIAGNOSIS — L30.9 DERMATITIS: Primary | ICD-10-CM

## 2020-11-10 PROCEDURE — 99283 EMERGENCY DEPT VISIT LOW MDM: CPT

## 2020-11-10 RX ORDER — DESONIDE 0.5 MG/G
OINTMENT TOPICAL 2 TIMES DAILY
Qty: 15 G | Refills: 1 | Status: SHIPPED | OUTPATIENT
Start: 2020-11-10

## 2020-11-11 NOTE — ED PROVIDER NOTES
Encounter Date: 11/10/2020       History     Chief Complaint   Patient presents with    Rash     rash to chest wall that comes and goes over the last 3 weeks.      The history is provided by the patient.   Rash   This is a new problem. The current episode started several weeks ago. The problem has been waxing and waning. The problem is associated with nothing. The rash is present on the torso. The pain is at a severity of 2/10. The pain has been constant since onset. Associated symptoms include itching.     Review of patient's allergies indicates:  No Known Allergies  No past medical history on file.  No past surgical history on file.  No family history on file.  Social History     Tobacco Use    Smoking status: Never Smoker    Smokeless tobacco: Never Used   Substance Use Topics    Alcohol use: Yes    Drug use: Never     Review of Systems   Constitutional: Negative for chills and fever.   HENT: Negative for sore throat.    Eyes: Negative for photophobia and redness.   Respiratory: Negative for cough and shortness of breath.    Cardiovascular: Negative for chest pain.   Gastrointestinal: Negative for abdominal pain, diarrhea and nausea.   Endocrine: Negative for polydipsia and polyphagia.   Genitourinary: Negative for dysuria.   Musculoskeletal: Negative for arthralgias, back pain and myalgias.   Skin: Positive for itching and rash.   Neurological: Negative for weakness and headaches.   Hematological: Does not bruise/bleed easily.   Psychiatric/Behavioral: The patient is not nervous/anxious.    All other systems reviewed and are negative.      Physical Exam     Initial Vitals [11/10/20 2112]   BP Pulse Resp Temp SpO2   (!) 133/92 88 20 98.6 °F (37 °C) 99 %      MAP       --         Physical Exam    Nursing note and vitals reviewed.  Constitutional: Vital signs are normal. She appears well-developed and well-nourished. No distress.   HENT:   Head: Normocephalic and atraumatic.   Right Ear: External ear normal.    Left Ear: External ear normal.   Nose: Nose normal.   Mouth/Throat: Oropharynx is clear and moist.   Eyes: Conjunctivae, EOM and lids are normal. Pupils are equal, round, and reactive to light.   Neck: Normal range of motion and full passive range of motion without pain. Neck supple.   Cardiovascular: Normal rate, regular rhythm, S1 normal, S2 normal, normal heart sounds, intact distal pulses and normal pulses.   Pulmonary/Chest: Breath sounds normal. No respiratory distress. She has no wheezes. She has no rales.   Abdominal: Soft. Normal appearance and bowel sounds are normal. She exhibits no distension. There is no abdominal tenderness.   Musculoskeletal: Normal range of motion.   Lymphadenopathy:     She has no cervical adenopathy.   Neurological: She is alert and oriented to person, place, and time. She has normal strength. No cranial nerve deficit or sensory deficit. Coordination and gait normal.   Skin: Skin is warm, dry and intact. Rash (dry scaling rash to anterior midline chest;  non-tender) noted.   Psychiatric: She has a normal mood and affect. Her speech is normal and behavior is normal. Judgment and thought content normal. Cognition and memory are normal.         ED Course   Procedures  Labs Reviewed   HIV 1 / 2 ANTIBODY   HEPATITIS C ANTIBODY          Imaging Results    None                                      Clinical Impression:       ICD-10-CM ICD-9-CM   1. Dermatitis  L30.9 692.9                      Disposition:   Disposition: Discharged  Condition: Stable     ED Disposition Condition    Discharge Stable        ED Prescriptions     Medication Sig Dispense Start Date End Date Auth. Provider    desonide 0.05% (DESOWEN) 0.05 % Oint Apply topically 2 (two) times daily. 15 g 11/10/2020  RAJ Bermudez        Follow-up Information     Follow up With Specialties Details Why Contact Info    PCP  Go in 2 days                                         RAJ Bermudez  11/10/20 7490

## 2020-12-22 ENCOUNTER — HOSPITAL ENCOUNTER (EMERGENCY)
Facility: HOSPITAL | Age: 20
Discharge: HOME | End: 2020-12-22
Attending: EMERGENCY MEDICINE
Payer: MEDICAID

## 2020-12-22 VITALS
HEIGHT: 62 IN | TEMPERATURE: 99 F | RESPIRATION RATE: 18 BRPM | WEIGHT: 286.5 LBS | SYSTOLIC BLOOD PRESSURE: 143 MMHG | HEART RATE: 75 BPM | BODY MASS INDEX: 52.72 KG/M2 | OXYGEN SATURATION: 97 % | DIASTOLIC BLOOD PRESSURE: 65 MMHG

## 2020-12-22 DIAGNOSIS — R10.30 LOWER ABDOMINAL PAIN: Primary | ICD-10-CM

## 2020-12-22 DIAGNOSIS — Z20.2 POSSIBLE EXPOSURE TO STD: ICD-10-CM

## 2020-12-22 LAB
B-HCG UR QL: NEGATIVE
BILIRUB UR QL STRIP: NEGATIVE
CLARITY UR: CLEAR
COLOR UR: YELLOW
GLUCOSE UR QL STRIP: NEGATIVE
HGB UR QL STRIP: NEGATIVE
KETONES UR QL STRIP: NEGATIVE
LEUKOCYTE ESTERASE UR QL STRIP: NEGATIVE
NITRITE UR QL STRIP: NEGATIVE
PH UR STRIP: 6 [PH] (ref 5–8)
PROT UR QL STRIP: NEGATIVE
SP GR UR STRIP: >=1.03 (ref 1–1.03)
URN SPEC COLLECT METH UR: ABNORMAL
UROBILINOGEN UR STRIP-ACNC: NEGATIVE EU/DL

## 2020-12-22 PROCEDURE — 81003 URINALYSIS AUTO W/O SCOPE: CPT

## 2020-12-22 PROCEDURE — 63600175 PHARM REV CODE 636 W HCPCS: Performed by: REGISTERED NURSE

## 2020-12-22 PROCEDURE — 99284 EMERGENCY DEPT VISIT MOD MDM: CPT | Mod: 25

## 2020-12-22 PROCEDURE — 63700000 PHARM REV CODE 250 ALT 637 W/O HCPCS: Performed by: REGISTERED NURSE

## 2020-12-22 PROCEDURE — 96372 THER/PROPH/DIAG INJ SC/IM: CPT

## 2020-12-22 PROCEDURE — 81025 URINE PREGNANCY TEST: CPT

## 2020-12-22 RX ORDER — CEFTRIAXONE 250 MG/1
250 INJECTION, POWDER, FOR SOLUTION INTRAMUSCULAR; INTRAVENOUS
Status: COMPLETED | OUTPATIENT
Start: 2020-12-22 | End: 2020-12-22

## 2020-12-22 RX ORDER — AZITHROMYCIN 250 MG/1
1000 TABLET, FILM COATED ORAL
Status: COMPLETED | OUTPATIENT
Start: 2020-12-22 | End: 2020-12-22

## 2020-12-22 RX ADMIN — CEFTRIAXONE SODIUM 250 MG: 250 INJECTION, POWDER, FOR SOLUTION INTRAMUSCULAR; INTRAVENOUS at 05:12

## 2020-12-22 RX ADMIN — AZITHROMYCIN MONOHYDRATE 1000 MG: 250 TABLET ORAL at 05:12

## 2020-12-22 NOTE — Clinical Note
"Yecenia "Van Clarke was seen and treated in our emergency department on 12/22/2020.  She may return to work on 12/23/2020.       If you have any questions or concerns, please don't hesitate to call.      Andres Pro Jr., FNP"

## 2020-12-22 NOTE — ED NOTES
Patient identifiers verified and correct for Yecenia Deshae Green.    LOC: The patient is awake, alert and aware of environment with an appropriate affect, the patient is oriented x 3 and speaking appropriately.  APPEARANCE: Patient resting comfortably and in no acute distress, patient is clean and well groomed, patient's clothing is properly fastened.  SKIN: The skin is warm and dry, color consistent with ethnicity, patient has normal skin turgor and moist mucus membranes, skin intact, no breakdown or bruising noted.  MUSCULOSKELETAL: Patient moving all extremities spontaneously.  RESPIRATORY: Airway is open and patent, respirations are spontaneous.  CARDIAC: Patient has a normal rate, no periphreal edema noted, capillary refill < 3 seconds.  ABDOMEN: Soft and non tender to palpation.Patient identifiers verified and correct for Yecenia Deshae Green.    LOC: The patient is awake, alert and aware of environment with an appropriate affect, the patient is oriented x 3 and speaking appropriately.  APPEARANCE: Patient resting comfortably and in no acute distress, patient is clean and well groomed, patient's clothing is properly fastened.  SKIN: The skin is warm and dry, color consistent with ethnicity, patient has normal skin turgor and moist mucus membranes, skin intact, no breakdown or bruising noted.  MUSCULOSKELETAL: Patient moving all extremities spontaneously.  RESPIRATORY: Airway is open and patent, respirations are spontaneous.  CARDIAC: Patient has a normal rate, no periphreal edema noted, capillary refill < 3 seconds.  ABDOMEN: Soft and non tender to palpation, abdominal discomfort, nausea

## 2020-12-22 NOTE — ED NOTES
Assumed care of pt at this time. Pt sitting in RWR comfortably. AAO x 4. Resp even and unlabored with equal chest rise and fall. Skin warm and dry. No distress noted. Pt denies any needs or assist at this time.

## 2020-12-22 NOTE — ED PROVIDER NOTES
Encounter Date: 12/22/2020       History     Chief Complaint   Patient presents with    Abdominal Pain     pain to low abd x 2 days +nausea     The history is provided by the patient.   Abdominal Pain  The current episode started two days ago. The onset of the illness was gradual. The problem has been gradually improving. The abdominal pain is located in the suprapubic region. The abdominal pain does not radiate. The other symptoms of the illness do not include fever, shortness of breath, nausea, vomiting, diarrhea, dysuria, vaginal discharge or vaginal bleeding.   The patient states that she believes she is currently not pregnant. The patient has not had a change in bowel habit. Symptoms associated with the illness do not include back pain.   Pt also reports possible exposure to STD    Review of patient's allergies indicates:  No Known Allergies  No past medical history on file.  No past surgical history on file.  No family history on file.  Social History     Tobacco Use    Smoking status: Never Smoker    Smokeless tobacco: Never Used   Substance Use Topics    Alcohol use: Yes    Drug use: Never     Review of Systems   Constitutional: Negative for fever.   HENT: Negative for sore throat.    Respiratory: Negative for shortness of breath.    Cardiovascular: Negative for chest pain.   Gastrointestinal: Positive for abdominal pain. Negative for diarrhea, nausea and vomiting.   Genitourinary: Negative for dysuria, vaginal bleeding and vaginal discharge.   Musculoskeletal: Negative for back pain.   Skin: Negative for rash.   Neurological: Negative for weakness.   Hematological: Does not bruise/bleed easily.   All other systems reviewed and are negative.      Physical Exam     Initial Vitals [12/22/20 1336]   BP Pulse Resp Temp SpO2   (!) 143/65 75 18 98.5 °F (36.9 °C) 97 %      MAP       --         Physical Exam    Constitutional: She appears well-developed and well-nourished. She is not diaphoretic. She is Obese .  No distress.   HENT:   Head: Normocephalic and atraumatic.   Eyes: Conjunctivae and EOM are normal. Pupils are equal, round, and reactive to light.   Neck: Normal range of motion. Neck supple.   Cardiovascular: Normal rate, regular rhythm and normal heart sounds.   No murmur heard.  Pulmonary/Chest: Breath sounds normal. No respiratory distress. She has no wheezes. She has no rales.   Abdominal: Soft. Bowel sounds are normal. There is no abdominal tenderness. There is no rebound, no guarding and no CVA tenderness.   Musculoskeletal: Normal range of motion. No tenderness or edema.   Neurological: She is alert and oriented to person, place, and time. No cranial nerve deficit. GCS score is 15. GCS eye subscore is 4. GCS verbal subscore is 5. GCS motor subscore is 6.   Skin: Skin is warm and dry. Capillary refill takes less than 2 seconds.   Psychiatric: She has a normal mood and affect. Thought content normal.         ED Course   Procedures  Labs Reviewed   URINALYSIS, REFLEX TO URINE CULTURE - Abnormal; Notable for the following components:       Result Value    Specific Gravity, UA >=1.030 (*)     All other components within normal limits    Narrative:     Specimen Source->Urine   PREGNANCY TEST, URINE RAPID   PREGNANCY TEST, URINE RAPID          Imaging Results    None           5:00 PM: Pt states that she needs to leave because her ride is here    I discussed with patient and/or family/caretaker that evaluation in the ED does not suggest any emergent or life threatening medical conditions requiring immediate intervention beyond what was provided in the ED, and I believe patient is safe for discharge.  Regardless, an unremarkable evaluation in the ED does not preclude the development or presence of a serious of life threatening condition. As such, patient was instructed to return immediately for any worsening or change in current symptoms.                             Clinical Impression:     ICD-10-CM ICD-9-CM   1.  Lower abdominal pain  R10.30 789.09   2. Possible exposure to STD  Z20.2 V01.6                          ED Disposition Condition    Discharge Stable        ED Prescriptions     None        Follow-up Information     Follow up With Specialties Details Why Contact Info    [facility] -  Emergency Medicine  If symptoms worsen [id number] [facility] [address] [phone]                                       Andres Pro Jr., FNP  12/22/20 1701

## 2020-12-23 ENCOUNTER — LAB VISIT (OUTPATIENT)
Dept: PRIMARY CARE CLINIC | Facility: OTHER | Age: 20
End: 2020-12-23
Attending: INTERNAL MEDICINE
Payer: MEDICAID

## 2020-12-23 DIAGNOSIS — R50.9 FEVER, UNSPECIFIED FEVER CAUSE: ICD-10-CM

## 2020-12-23 DIAGNOSIS — R05.9 COUGH: ICD-10-CM

## 2020-12-23 DIAGNOSIS — R50.9 FEVER: ICD-10-CM

## 2020-12-23 DIAGNOSIS — R11.0 NAUSEA: ICD-10-CM

## 2020-12-23 DIAGNOSIS — Z03.818 ENCOUNTER FOR OBSERVATION FOR SUSPECTED EXPOSURE TO OTHER BIOLOGICAL AGENTS RULED OUT: Primary | ICD-10-CM

## 2020-12-23 PROCEDURE — U0003 INFECTIOUS AGENT DETECTION BY NUCLEIC ACID (DNA OR RNA); SEVERE ACUTE RESPIRATORY SYNDROME CORONAVIRUS 2 (SARS-COV-2) (CORONAVIRUS DISEASE [COVID-19]), AMPLIFIED PROBE TECHNIQUE, MAKING USE OF HIGH THROUGHPUT TECHNOLOGIES AS DESCRIBED BY CMS-2020-01-R: HCPCS

## 2020-12-25 DIAGNOSIS — U07.1 COVID-19 VIRUS DETECTED: ICD-10-CM

## 2020-12-25 LAB — SARS-COV-2 RNA RESP QL NAA+PROBE: DETECTED

## 2020-12-26 ENCOUNTER — NURSE TRIAGE (OUTPATIENT)
Dept: ADMINISTRATIVE | Facility: CLINIC | Age: 20
End: 2020-12-26

## 2020-12-26 NOTE — TELEPHONE ENCOUNTER
Left VM    Reason for Disposition   Caller has cancelled the call before the first contact    Protocols used: NO CONTACT OR DUPLICATE CONTACT CALL-A-AH

## 2022-02-28 ENCOUNTER — PATIENT MESSAGE (OUTPATIENT)
Dept: RESEARCH | Facility: HOSPITAL | Age: 22
End: 2022-02-28
Payer: MEDICAID

## 2022-11-22 ENCOUNTER — HOSPITAL ENCOUNTER (EMERGENCY)
Facility: HOSPITAL | Age: 22
Discharge: HOME | End: 2022-11-22
Attending: EMERGENCY MEDICINE
Payer: MEDICAID

## 2022-11-22 VITALS
DIASTOLIC BLOOD PRESSURE: 76 MMHG | OXYGEN SATURATION: 98 % | TEMPERATURE: 99 F | WEIGHT: 258.94 LBS | RESPIRATION RATE: 20 BRPM | BODY MASS INDEX: 47.65 KG/M2 | SYSTOLIC BLOOD PRESSURE: 128 MMHG | HEART RATE: 86 BPM | HEIGHT: 62 IN

## 2022-11-22 DIAGNOSIS — R11.10 VOMITING AND DIARRHEA: Primary | ICD-10-CM

## 2022-11-22 DIAGNOSIS — R19.7 VOMITING AND DIARRHEA: Primary | ICD-10-CM

## 2022-11-22 LAB
ALBUMIN SERPL BCP-MCNC: 4.2 G/DL (ref 3.5–5.2)
ALP SERPL-CCNC: 110 U/L (ref 55–135)
ALT SERPL W/O P-5'-P-CCNC: 11 U/L (ref 10–44)
ANION GAP SERPL CALC-SCNC: 12 MMOL/L (ref 8–16)
AST SERPL-CCNC: 9 U/L (ref 10–40)
B-HCG UR QL: NEGATIVE
BACTERIA #/AREA URNS HPF: NORMAL /HPF
BASOPHILS # BLD AUTO: 0.03 K/UL (ref 0–0.2)
BASOPHILS NFR BLD: 0.5 % (ref 0–1.9)
BILIRUB SERPL-MCNC: 1.3 MG/DL (ref 0.1–1)
BILIRUB UR QL STRIP: ABNORMAL
BUN SERPL-MCNC: 14 MG/DL (ref 6–20)
CALCIUM SERPL-MCNC: 9.8 MG/DL (ref 8.7–10.5)
CHLORIDE SERPL-SCNC: 104 MMOL/L (ref 95–110)
CLARITY UR: ABNORMAL
CO2 SERPL-SCNC: 22 MMOL/L (ref 23–29)
COLOR UR: ABNORMAL
CREAT SERPL-MCNC: 0.8 MG/DL (ref 0.5–1.4)
DIFFERENTIAL METHOD: NORMAL
EOSINOPHIL # BLD AUTO: 0 K/UL (ref 0–0.5)
EOSINOPHIL NFR BLD: 0.2 % (ref 0–8)
ERYTHROCYTE [DISTWIDTH] IN BLOOD BY AUTOMATED COUNT: 12.3 % (ref 11.5–14.5)
EST. GFR  (NO RACE VARIABLE): >60 ML/MIN/1.73 M^2
GLUCOSE SERPL-MCNC: 91 MG/DL (ref 70–110)
GLUCOSE UR QL STRIP: NEGATIVE
HCT VFR BLD AUTO: 42.2 % (ref 37–48.5)
HGB BLD-MCNC: 14.1 G/DL (ref 12–16)
HGB UR QL STRIP: NEGATIVE
HYALINE CASTS #/AREA URNS LPF: 0 /LPF
IMM GRANULOCYTES # BLD AUTO: 0.02 K/UL (ref 0–0.04)
IMM GRANULOCYTES NFR BLD AUTO: 0.3 % (ref 0–0.5)
KETONES UR QL STRIP: ABNORMAL
LEUKOCYTE ESTERASE UR QL STRIP: ABNORMAL
LIPASE SERPL-CCNC: 19 U/L (ref 4–60)
LYMPHOCYTES # BLD AUTO: 1.6 K/UL (ref 1–4.8)
LYMPHOCYTES NFR BLD: 24.7 % (ref 18–48)
MCH RBC QN AUTO: 30.1 PG (ref 27–31)
MCHC RBC AUTO-ENTMCNC: 33.4 G/DL (ref 32–36)
MCV RBC AUTO: 90 FL (ref 82–98)
MICROSCOPIC COMMENT: NORMAL
MONOCYTES # BLD AUTO: 0.3 K/UL (ref 0.3–1)
MONOCYTES NFR BLD: 5.1 % (ref 4–15)
NEUTROPHILS # BLD AUTO: 4.5 K/UL (ref 1.8–7.7)
NEUTROPHILS NFR BLD: 69.2 % (ref 38–73)
NITRITE UR QL STRIP: NEGATIVE
NRBC BLD-RTO: 0 /100 WBC
PH UR STRIP: 6 [PH] (ref 5–8)
PLATELET # BLD AUTO: 289 K/UL (ref 150–450)
PMV BLD AUTO: 10.6 FL (ref 9.2–12.9)
POTASSIUM SERPL-SCNC: 3.7 MMOL/L (ref 3.5–5.1)
PROT SERPL-MCNC: 8.3 G/DL (ref 6–8.4)
PROT UR QL STRIP: ABNORMAL
RBC # BLD AUTO: 4.69 M/UL (ref 4–5.4)
RBC #/AREA URNS HPF: 0 /HPF (ref 0–4)
SODIUM SERPL-SCNC: 138 MMOL/L (ref 136–145)
SP GR UR STRIP: >1.03 (ref 1–1.03)
SQUAMOUS #/AREA URNS HPF: 9 /HPF
URN SPEC COLLECT METH UR: ABNORMAL
UROBILINOGEN UR STRIP-ACNC: ABNORMAL EU/DL
WBC # BLD AUTO: 6.48 K/UL (ref 3.9–12.7)
WBC #/AREA URNS HPF: 5 /HPF (ref 0–5)

## 2022-11-22 PROCEDURE — 99284 EMERGENCY DEPT VISIT MOD MDM: CPT | Mod: 25

## 2022-11-22 PROCEDURE — 81000 URINALYSIS NONAUTO W/SCOPE: CPT | Performed by: REGISTERED NURSE

## 2022-11-22 PROCEDURE — 83690 ASSAY OF LIPASE: CPT | Performed by: REGISTERED NURSE

## 2022-11-22 PROCEDURE — 25000003 PHARM REV CODE 250: Performed by: REGISTERED NURSE

## 2022-11-22 PROCEDURE — 63600175 PHARM REV CODE 636 W HCPCS: Performed by: REGISTERED NURSE

## 2022-11-22 PROCEDURE — 96374 THER/PROPH/DIAG INJ IV PUSH: CPT

## 2022-11-22 PROCEDURE — 85025 COMPLETE CBC W/AUTO DIFF WBC: CPT | Performed by: REGISTERED NURSE

## 2022-11-22 PROCEDURE — 80053 COMPREHEN METABOLIC PANEL: CPT | Performed by: REGISTERED NURSE

## 2022-11-22 PROCEDURE — 81025 URINE PREGNANCY TEST: CPT | Performed by: REGISTERED NURSE

## 2022-11-22 PROCEDURE — 96361 HYDRATE IV INFUSION ADD-ON: CPT

## 2022-11-22 RX ORDER — ONDANSETRON 2 MG/ML
4 INJECTION INTRAMUSCULAR; INTRAVENOUS
Status: COMPLETED | OUTPATIENT
Start: 2022-11-22 | End: 2022-11-22

## 2022-11-22 RX ORDER — ONDANSETRON 4 MG/1
4 TABLET, ORALLY DISINTEGRATING ORAL EVERY 8 HOURS PRN
Qty: 12 TABLET | Refills: 0 | Status: SHIPPED | OUTPATIENT
Start: 2022-11-22

## 2022-11-22 RX ADMIN — ONDANSETRON 4 MG: 2 INJECTION INTRAMUSCULAR; INTRAVENOUS at 06:11

## 2022-11-22 RX ADMIN — SODIUM CHLORIDE 1000 ML: 0.9 INJECTION, SOLUTION INTRAVENOUS at 06:11

## 2022-11-22 NOTE — FIRST PROVIDER EVALUATION
"Medical screening examination initiated.  I have conducted a focused provider triage encounter, findings are as follows:    Brief history of present illness:  Nausea/vomiting that began last night, patient reports mild abdominal pain    Vitals:    11/22/22 1536   BP: (!) 184/96   BP Location: Right arm   Patient Position: Sitting   Pulse: 72   Resp: 18   Temp: 98.9 °F (37.2 °C)   TempSrc: Oral   SpO2: 98%   Weight: 117.4 kg (258 lb 14.9 oz)   Height: 5' 2" (1.575 m)       Pertinent physical exam:  No acute distress, vital signs stable, patient alert and oriented    Brief workup plan:  Labs and further evaluation    Preliminary workup initiated; this workup will be continued and followed by the physician or advanced practice provider that is assigned to the patient when roomed.  "

## 2022-11-23 NOTE — ED PROVIDER NOTES
Encounter Date: 11/22/2022       History     Chief Complaint   Patient presents with    Vomiting     Pt having N/V since yesterday, c/o abdominal pain x 2 days and started with diarrhea this morning     22-year-old female presents emergency department with complaints of nausea/vomiting that began yesterday.  Patient reports diarrhea that began today with abdominal cramping.  Patient denies any fever chills this time.    The history is provided by the patient.   Review of patient's allergies indicates:  No Known Allergies  No past medical history on file.  No past surgical history on file.  No family history on file.  Social History     Tobacco Use    Smoking status: Never    Smokeless tobacco: Never   Substance Use Topics    Alcohol use: Yes    Drug use: Yes     Types: Marijuana     Review of Systems   Constitutional:  Negative for fever.   HENT:  Negative for sore throat.    Respiratory:  Negative for shortness of breath.    Cardiovascular:  Negative for chest pain.   Gastrointestinal:  Positive for diarrhea, nausea and vomiting.   Genitourinary:  Negative for dysuria.   Musculoskeletal:  Negative for back pain.   Skin:  Negative for rash.   Neurological:  Negative for weakness.   Hematological:  Does not bruise/bleed easily.   All other systems reviewed and are negative.    Physical Exam     Initial Vitals [11/22/22 1536]   BP Pulse Resp Temp SpO2   (!) 184/96 72 18 98.9 °F (37.2 °C) 98 %      MAP       --         Physical Exam    Constitutional: She appears well-developed and well-nourished. She is not diaphoretic. No distress.   HENT:   Head: Normocephalic and atraumatic.   Eyes: Conjunctivae and EOM are normal. Pupils are equal, round, and reactive to light.   Neck: Neck supple.   Normal range of motion.  Cardiovascular:  Normal rate, regular rhythm and normal heart sounds.           No murmur heard.  Pulmonary/Chest: Breath sounds normal. No respiratory distress. She has no wheezes. She has no rales.    Abdominal: Abdomen is soft. Bowel sounds are normal. There is no abdominal tenderness. There is no rebound and no guarding.   Musculoskeletal:         General: No tenderness or edema. Normal range of motion.      Cervical back: Normal range of motion and neck supple.     Neurological: She is alert and oriented to person, place, and time. No cranial nerve deficit. GCS score is 15. GCS eye subscore is 4. GCS verbal subscore is 5. GCS motor subscore is 6.   Skin: Skin is warm and dry. Capillary refill takes less than 2 seconds.   Psychiatric: She has a normal mood and affect. Thought content normal.       ED Course   Procedures  Labs Reviewed   COMPREHENSIVE METABOLIC PANEL - Abnormal; Notable for the following components:       Result Value    CO2 22 (*)     Total Bilirubin 1.3 (*)     AST 9 (*)     All other components within normal limits   URINALYSIS, REFLEX TO URINE CULTURE - Abnormal; Notable for the following components:    Color, UA Orange (*)     Appearance, UA Hazy (*)     Specific Gravity, UA >1.030 (*)     Protein, UA 1+ (*)     Ketones, UA 3+ (*)     Bilirubin (UA) 1+ (*)     Urobilinogen, UA 4.0-6.0 (*)     Leukocytes, UA 2+ (*)     All other components within normal limits    Narrative:     Specimen Source->Urine   CBC W/ AUTO DIFFERENTIAL   LIPASE   PREGNANCY TEST, URINE RAPID    Narrative:     Specimen Source->Urine   URINALYSIS MICROSCOPIC    Narrative:     Specimen Source->Urine          Imaging Results    None          Medications   sodium chloride 0.9% bolus 1,000 mL (0 mLs Intravenous Stopped 11/22/22 1929)   ondansetron injection 4 mg (4 mg Intravenous Given by Other 11/22/22 1828)         I discussed with patient and/or family/caretaker that evaluation in the ED does not suggest any emergent or life threatening medical conditions requiring immediate intervention beyond what was provided in the ED, and I believe patient is safe for discharge.  Regardless, an unremarkable evaluation in the ED  does not preclude the development or presence of a serious of life threatening condition. As such, patient was instructed to return immediately for any worsening or change in current symptoms.                       Clinical Impression:   Final diagnoses:  [R11.10, R19.7] Vomiting and diarrhea (Primary)      ED Disposition Condition    Discharge Stable          ED Prescriptions       Medication Sig Dispense Start Date End Date Auth. Provider    ondansetron (ZOFRAN-ODT) 4 MG TbDL Take 1 tablet (4 mg total) by mouth every 8 (eight) hours as needed (vomiting). 12 tablet 11/22/2022 -- SREE Acevedo Jr.          Follow-up Information       Follow up With Specialties Details Why Contact Info    [facility] - Emergency Dept. Emergency Medicine  If symptoms worsen [id number] [facility] [address] [phone]             SREE Acevedo Jr.  11/22/22 6865